# Patient Record
Sex: MALE | Race: WHITE | Employment: FULL TIME | URBAN - METROPOLITAN AREA
[De-identification: names, ages, dates, MRNs, and addresses within clinical notes are randomized per-mention and may not be internally consistent; named-entity substitution may affect disease eponyms.]

---

## 2017-02-16 ENCOUNTER — OFFICE VISIT (OUTPATIENT)
Dept: INTERNAL MEDICINE CLINIC | Age: 52
End: 2017-02-16

## 2017-02-16 VITALS
BODY MASS INDEX: 28.51 KG/M2 | TEMPERATURE: 98.5 F | RESPIRATION RATE: 18 BRPM | HEART RATE: 87 BPM | SYSTOLIC BLOOD PRESSURE: 122 MMHG | OXYGEN SATURATION: 98 % | HEIGHT: 70 IN | DIASTOLIC BLOOD PRESSURE: 70 MMHG | WEIGHT: 199.13 LBS

## 2017-02-16 DIAGNOSIS — J06.9 VIRAL UPPER RESPIRATORY TRACT INFECTION: Primary | ICD-10-CM

## 2017-02-16 DIAGNOSIS — L30.9 ECZEMA, UNSPECIFIED TYPE: ICD-10-CM

## 2017-02-16 RX ORDER — OXYMETAZOLINE HCL 0.05 %
2 SPRAY, NON-AEROSOL (ML) NASAL 2 TIMES DAILY
Qty: 1 BOTTLE | Refills: 0 | COMMUNITY
End: 2017-06-27

## 2017-02-16 RX ORDER — FLUOCINONIDE CREAM (EMULSIFIED BASE) 0.5 MG/G
CREAM TOPICAL 2 TIMES DAILY
Qty: 60 G | Refills: 1 | Status: SHIPPED | OUTPATIENT
Start: 2017-02-16 | End: 2020-01-22

## 2017-02-16 RX ORDER — NAPROXEN SODIUM 220 MG
220 TABLET ORAL 2 TIMES DAILY WITH MEALS
Qty: 60 TAB | Refills: 0 | COMMUNITY
End: 2017-06-27

## 2017-02-16 NOTE — PROGRESS NOTES
HISTORY OF PRESENT ILLNESS  Raissa Watson is a 50 y.o. male. HPI  Upper respiratory illness:  Raissa Watson presents with complaints of congestion, sneezing, sore throat, post nasal drip, cough described as productive of clear sputum and clear nasal discharge for 4 days. no nausea and no vomiting . he has not had  night sweats, myalgias, fever and chills. Symptoms are moderate. Over-the-counter remedies including mucinex, sinus/ cold medication   has been used with poor relief of symptoms. Drinking plenty of fluids: yes  Asthma?:  no  non-smoker  Contacts with similar infections: yes       Rash:  he presents with rash/ skin problem located on lower legs, abdomen . Onset of skin problem was 2 months ago. Itching: yes. Flaking or scaling:no. Pain: no .  Weeping/ draining:  no.   Exposures: no  Medications tried include:  otc gold bond,  and was not effective. he does not have a history of eczema          ROS    Physical Exam   Constitutional: He appears well-developed and well-nourished. No distress. /70 (BP 1 Location: Left arm, BP Patient Position: Sitting)  Pulse 87  Temp 98.5 °F (36.9 °C) (Oral)   Resp 18  Ht 5' 9.5\" (1.765 m)  Wt 199 lb 2 oz (90.3 kg)  SpO2 98%  BMI 28.98 kg/m2   HENT:   Head: Normocephalic and atraumatic. Right Ear: Tympanic membrane and ear canal normal. No decreased hearing is noted. Left Ear: Tympanic membrane and ear canal normal. No decreased hearing is noted. Nose: Mucosal edema and rhinorrhea present. No epistaxis. Right sinus exhibits no maxillary sinus tenderness and no frontal sinus tenderness. Left sinus exhibits no maxillary sinus tenderness and no frontal sinus tenderness. Mouth/Throat: Uvula is midline and mucous membranes are normal. No oral lesions. Normal dentition. Posterior oropharyngeal erythema present. No oropharyngeal exudate, posterior oropharyngeal edema or tonsillar abscesses.    Eyes: Conjunctivae are normal. Pupils are equal, round, and reactive to light. Right eye exhibits no discharge. Left eye exhibits no discharge. No scleral icterus. Neck: Neck supple. Cardiovascular: Normal rate, regular rhythm and normal heart sounds. Pulmonary/Chest: Effort normal and breath sounds normal.   Lymphadenopathy:     He has no cervical adenopathy. Neurological: He is alert. Skin: Skin is warm and dry. He is not diaphoretic. Scattered erythematous vesicular rash where depicted. No pustules. Some excoriation   Nursing note and vitals reviewed. ASSESSMENT and PLAN  Vidal Marie was seen today for sinus infection. Diagnoses and all orders for this visit:    Viral upper respiratory tract infection  Kathy Corrales was diagnosed with a viral upper respiratory infection. he is advised to drink plenty of water, use shower steam or humidifier to loosen secretions, saline nasal lavage 3 times daily and get plenty of rest.  he may use mucinex 1200mg twice daily along with tylenol or advil as needed for fever and pain. Written instructions were given to the patient emphasizing these recommendations. The patient was advised to rinse nasal passages with saline solution. Instructions were given for this. Eczema, unspecified type  -     fluocinonide-emollient (LIDEX-E) 0.05 % topical cream; Apply  to affected area two (2) times a day. Follow-up Disposition:  Return if symptoms worsen or fail to improve.

## 2017-02-16 NOTE — MR AVS SNAPSHOT
Visit Information Date & Time Provider Department Dept. Phone Encounter #  
 2/16/2017  4:00 PM Lady Mills MD [facility] [address] [id number] Follow-up Instructions Return if symptoms worsen or fail to improve. Upcoming Health Maintenance Date Due DTaP/Tdap/Td series (1 - Tdap) 1/5/1988 FOBT Q 1 YEAR AGE 50-75 1/5/2017 Allergies as of 2/16/2017  Review Complete On: 2/16/2017 By: Will Robbins LPN No Known Allergies Current Immunizations  Never Reviewed Name Date Influenza Vaccine 9/21/2016, 11/16/2015 Not reviewed this visit You Were Diagnosed With   
  
 Codes Comments Viral upper respiratory tract infection    -  Primary ICD-10-CM: J06.9, B97.89 ICD-9-CM: 465.9 Eczema, unspecified type     ICD-10-CM: L30.9 ICD-9-CM: 692.9 Vitals BP Pulse Temp Resp Height(growth percentile) Weight(growth percentile) 122/70 (BP 1 Location: Left arm, BP Patient Position: Sitting) 87 98.5 °F (36.9 °C) (Oral) 18 5' 9.5\" (1.765 m) 199 lb 2 oz (90.3 kg) SpO2 BMI Smoking Status 98% 28.98 kg/m2 Never Smoker Vitals History BMI and BSA Data Body Mass Index Body Surface Area  
 28.98 kg/m 2 2.1 m 2 Preferred Pharmacy Pharmacy Name Phone [id number]- [place], [address] [place] [address]) [phone] Your Updated Medication List  
  
   
This list is accurate as of: 2/16/17  4:53 PM.  Always use your most recent med list. ADVIL COLD AND SINUS  mg Cap Generic drug:  Pseudoephedrine-Ibuprofen Take  by mouth as needed. AFRIN (OXYMETAZOLINE) 0.05 % nasal spray Generic drug:  oxymetazoline 2 Sprays two (2) times a day. ALEVE 220 mg tablet Generic drug:  naproxen sodium Take 1 Tab by mouth two (2) times daily (with meals). allopurinol 300 mg tablet Commonly known as: Emily Quesadaa Take 1 Tab by mouth daily. biotin 1 mg Tab Take 1 Tab by mouth daily. citalopram 20 mg tablet Commonly known as:  Algernon Kotyk Take 20 mg by mouth daily. finasteride 5 mg tablet Commonly known as:  PROSCAR Take 0.5 Tabs by mouth daily. FISH OIL 1,000 mg Cap Generic drug:  omega-3 fatty acids-vitamin e Take 4 Caps by mouth daily. fluocinoNIDE 0.05 % topical cream  
Commonly known as:  LIDEX APPLY TO RASH ON BODY TWICE A DAY AS NEEDED -- DO NOT USE ON FACE  
  
 fluocinonide-emollient 0.05 % topical cream  
Commonly known as:  LIDEX-E Apply  to affected area two (2) times a day. fluticasone 50 mcg/actuation nasal spray Commonly known as:  FLONASE  
as needed. * MUCINEX 1,200 mg Ta12 ER tablet Generic drug:  guaiFENesin Take 1,200 mg by mouth two (2) times a day. * guaiFENesin 1,200 mg Ta12 ER tablet Commonly known as:  Jaspreet & Jaspreet Take  by mouth two (2) times a day. MULTIVITAMIN PO Take  by mouth daily. simvastatin 20 mg tablet Commonly known as:  ZOCOR Take 1 Tab by mouth nightly. tretinoin 0.025 % tpical gel APPLY A PEA-SIZED AMOUNT TO FACE EXTERNALLY AT NIGHT * Notice: This list has 2 medication(s) that are the same as other medications prescribed for you. Read the directions carefully, and ask your doctor or other care provider to review them with you. Prescriptions Sent to Pharmacy Refills  
 fluocinonide-emollient (LIDEX-E) 0.05 % topical cream 1 Sig: Apply  to affected area two (2) times a day. Class: Normal  
 Pharmacy: [id number]- [address], [address] [address]) Ph #: [phone] Route: Topical  
  
Follow-up Instructions Return if symptoms worsen or fail to improve. Patient Instructions Atopic Dermatitis: Care Instructions Your Care Instructions Atopic dermatitis (also called eczema) is a skin problem that causes intense itching and a red, raised rash. In severe cases, the rash develops clear fluidfilled blisters. The rash is not contagious. People with this condition seem to have very sensitive immune systems that are likely to react to things that cause allergies. The immune system is the body's way of fighting infection. There is no cure for atopic dermatitis, but you may be able to control it with care at home. Follow-up care is a key part of your treatment and safety. Be sure to make and go to all appointments, and call your doctor if you are having problems. It's also a good idea to know your test results and keep a list of the medicines you take. How can you care for yourself at home? · Use moisturizer at least twice a day. · If your doctor prescribes a cream, use it as directed. If your doctor prescribes other medicine, take it exactly as directed. · Wash the affected area with water only. Soap can make dryness and itching worse. Pat dry. · Apply a moisturizer after bathing. Use a cream such as Lubriderm, Moisturel, or Cetaphil that does not irritate the skin or cause a rash. Apply the cream while your skin is still damp after lightly drying with a towel. · Use cold, wet cloths to reduce itching. · Keep cool, and stay out of the sun. · If itching affects your normal activities, an over-the-counter antihistamine, such as diphenhydramine (Benadryl) or loratadine (Claritin) may help. Read and follow all instructions on the label. When should you call for help? Call your doctor now or seek immediate medical care if: 
· Your rash gets worse and you have a fever. · You have new blisters or bruises, or the rash spreads and looks like a sunburn. · You have signs of infection, such as: 
¨ Increased pain, swelling, warmth, or redness. ¨ Red streaks leading from the rash. ¨ Pus draining from the rash. ¨ A fever. · You have crusting or oozing sores.  
· You have joint aches or body aches along with your rash. Watch closely for changes in your health, and be sure to contact your doctor if: 
· Your rash does not clear up after 2 to 3 weeks of home treatment. · Itching interferes with your sleep or daily activities. Where can you learn more? Go to http://vivi-marjorie.info/. Enter E605 in the search box to learn more about \"Atopic Dermatitis: Care Instructions. \" Current as of: February 5, 2016 Content Version: 11.1 © 6998-2603 Urbantech. Care instructions adapted under license by Pixafy (which disclaims liability or warranty for this information). If you have questions about a medical condition or this instruction, always ask your healthcare professional. [address] any warranty or liability for your use of this information. Saline Nasal Washes: Care Instructions Your Care Instructions Saline nasal washes help keep the nasal passages open by washing out thick or dried mucus. This simple remedy can help relieve symptoms of allergies, sinusitis, and colds. It also can make the nose feel more comfortable by keeping the mucous membranes moist. You may notice a little burning sensation in your nose the first few times you use the solution, but this usually gets better in a few days. Follow-up care is a key part of your treatment and safety. Be sure to make and go to all appointments, and call your doctor if you are having problems. It's also a good idea to know your test results and keep a list of the medicines you take. How can you care for yourself at home? · You can buy premixed saline solution in a squeeze bottle or other sinus rinse products at a drugstore. Read and follow the instructions on the label. · You also can make your own saline solution by adding 1 teaspoon of salt and 1 teaspoon of baking soda to 2 cups of distilled water. · If you use a homemade solution, pour a small amount into a clean bowl. Using a rubber bulb syringe, squeeze the syringe and place the tip in the salt water. Pull a small amount of the salt water into the syringe by relaxing your hand. · Sit down with your head tilted slightly back. Do not lie down. Put the tip of the bulb syringe or the squeeze bottle a little way into one of your nostrils. Gently drip or squirt a few drops into the nostril. Repeat with the other nostril. Some sneezing and gagging are normal at first. 
· Gently blow your nose. · Wipe the syringe or bottle tip clean after each use. · Repeat this 2 or 3 times a day. · Use nasal washes gently if you have nosebleeds often. When should you call for help? Watch closely for changes in your health, and be sure to contact your doctor if: 
· You often get nosebleeds. · You have problems doing the nasal washes. Where can you learn more? Go to http://viviAQSmarjorie.info/. Enter [id number] in the search box to learn more about \"Saline Nasal Washes: Care Instructions. \" Current as of: July 29, 2016 Content Version: 11.1 © 6346-1579 [employer]. Care instructions adapted under license by Netviewer (which disclaims liability or warranty for this information). If you have questions about a medical condition or this instruction, always ask your healthcare professional. [address] any warranty or liability for your use of this information. Upper Respiratory Infection (Cold): Care Instructions Your Care Instructions An upper respiratory infection, or URI, is an infection of the nose, sinuses, or throat. URIs are spread by coughs, sneezes, and direct contact. The common cold is the most frequent kind of URI. The flu and sinus infections are other kinds of URIs. Almost all URIs are caused by viruses. Antibiotics won't cure them. But you can treat most infections with home care.  This may include drinking lots of fluids and taking over-the-counter pain medicine. You will probably feel better in 4 to 10 days. The doctor has checked you carefully, but problems can develop later. If you notice any problems or new symptoms, get medical treatment right away. Follow-up care is a key part of your treatment and safety. Be sure to make and go to all appointments, and call your doctor if you are having problems. It's also a good idea to know your test results and keep a list of the medicines you take. How can you care for yourself at home? · To prevent dehydration, drink plenty of fluids, enough so that your urine is light yellow or clear like water. Choose water and other caffeine-free clear liquids until you feel better. If you have kidney, heart, or liver disease and have to limit fluids, talk with your doctor before you increase the amount of fluids you drink. · Take an over-the-counter pain medicine, such as acetaminophen (Tylenol), ibuprofen (Advil, Motrin), or naproxen (Aleve). Read and follow all instructions on the label. · Before you use cough and cold medicines, check the label. These medicines may not be safe for young children or for people with certain health problems. · Be careful when taking over-the-counter cold or flu medicines and Tylenol at the same time. Many of these medicines have acetaminophen, which is Tylenol. Read the labels to make sure that you are not taking more than the recommended dose. Too much acetaminophen (Tylenol) can be harmful. · Get plenty of rest. 
· Do not smoke or allow others to smoke around you. If you need help quitting, talk to your doctor about stop-smoking programs and medicines. These can increase your chances of quitting for good. When should you call for help? Call 911 anytime you think you may need emergency care. For example, call if: 
· You have severe trouble breathing. Call your doctor now or seek immediate medical care if: 
· You seem to be getting much sicker. · You have new or worse trouble breathing. · You have a new or higher fever. · You have a new rash. Watch closely for changes in your health, and be sure to contact your doctor if: 
· You have a new symptom, such as a sore throat, an earache, or sinus pain. · You cough more deeply or more often, especially if you notice more mucus or a change in the color of your mucus. · You do not get better as expected. Where can you learn more? Go to http://vivi-marjorie.info/. Enter U030 in the search box to learn more about \"Upper Respiratory Infection (Cold): Care Instructions. \" Current as of: June 30, 2016 Content Version: 11.1 © 7776-7841 Endoclear. Care instructions adapted under license by SureWaves (which disclaims liability or warranty for this information). If you have questions about a medical condition or this instruction, always ask your healthcare professional. [address] any warranty or liability for your use of this information. Introducing [facility]! Camelia Banks introduces Radiojar patient portal. Now you can access parts of your medical record, email your doctor's office, and request medication refills online. 1. In your internet browser, go to [email or web address] 2. Click on the First Time User? Click Here link in the Sign In box. You will see the New Member Sign Up page. 3. Enter your Radiojar Access Code exactly as it appears below. You will not need to use this code after youve completed the sign-up process. If you do not sign up before the expiration date, you must request a new code. · [employer]Wing[employer] Access Code: BLANCA SHARPE [facility] Expires: 5/17/2017  4:53 PM 
 
4. Enter the last four digits of your Social Security Number (xxxx) and Date of Birth (mm/dd/yyyy) as indicated and click Submit. You will be taken to the next sign-up page. 5. Create a TradeHerot ID.  This will be your TradeHerot login ID and cannot be changed, so think of one that is secure and easy to remember. 6. Create a Intention Technology password. You can change your password at any time. 7. Enter your Password Reset Question and Answer. This can be used at a later time if you forget your password. 8. Enter your e-mail address. You will receive e-mail notification when new information is available in [address] 9. Click Sign Up. You can now view and download portions of your medical record. 10. Click the Download Summary menu link to download a portable copy of your medical information. If you have questions, please visit the Frequently Asked Questions section of the [employer]ention Technology website. Remember, [employer] Technology is NOT to be used for urgent needs. For medical emergencies, dial 911. Now available from your iPhone and Android! Please provide this summary of care documentation to your next provider. Your primary care clinician is listed as Christel Judd. If you have any questions after today's visit, please call [phone].

## 2017-02-16 NOTE — PATIENT INSTRUCTIONS
Atopic Dermatitis: Care Instructions  Your Care Instructions  Atopic dermatitis (also called eczema) is a skin problem that causes intense itching and a red, raised rash. In severe cases, the rash develops clear fluid-filled blisters. The rash is not contagious. People with this condition seem to have very sensitive immune systems that are likely to react to things that cause allergies. The immune system is the body's way of fighting infection. There is no cure for atopic dermatitis, but you may be able to control it with care at home. Follow-up care is a key part of your treatment and safety. Be sure to make and go to all appointments, and call your doctor if you are having problems. It's also a good idea to know your test results and keep a list of the medicines you take. How can you care for yourself at home? · Use moisturizer at least twice a day. · If your doctor prescribes a cream, use it as directed. If your doctor prescribes other medicine, take it exactly as directed. · Wash the affected area with water only. Soap can make dryness and itching worse. Pat dry. · Apply a moisturizer after bathing. Use a cream such as Lubriderm, Moisturel, or Cetaphil that does not irritate the skin or cause a rash. Apply the cream while your skin is still damp after lightly drying with a towel. · Use cold, wet cloths to reduce itching. · Keep cool, and stay out of the sun. · If itching affects your normal activities, an over-the-counter antihistamine, such as diphenhydramine (Benadryl) or loratadine (Claritin) may help. Read and follow all instructions on the label. When should you call for help? Call your doctor now or seek immediate medical care if:  · Your rash gets worse and you have a fever. · You have new blisters or bruises, or the rash spreads and looks like a sunburn. · You have signs of infection, such as:  ¨ Increased pain, swelling, warmth, or redness. ¨ Red streaks leading from the rash.   ¨ Pus draining from the rash. ¨ A fever. · You have crusting or oozing sores. · You have joint aches or body aches along with your rash. Watch closely for changes in your health, and be sure to contact your doctor if:  · Your rash does not clear up after 2 to 3 weeks of home treatment. · Itching interferes with your sleep or daily activities. Where can you learn more? Go to http://vivi-marjorie.info/. Enter T834 in the search box to learn more about \"Atopic Dermatitis: Care Instructions. \"  Current as of: February 5, 2016  Content Version: 11.1  © 7448-4770 [employer]. Care instructions adapted under license by [employer] (which disclaims liability or warranty for this information). If you have questions about a medical condition or this instruction, always ask your healthcare professional. [place] [address] any warranty or liability for your use of this information. Saline Nasal Washes: Care Instructions  Your Care Instructions  Saline nasal washes help keep the nasal passages open by washing out thick or dried mucus. This simple remedy can help relieve symptoms of allergies, sinusitis, and colds. It also can make the nose feel more comfortable by keeping the mucous membranes moist. You may notice a little burning sensation in your nose the first few times you use the solution, but this usually gets better in a few days. Follow-up care is a key part of your treatment and safety. Be sure to make and go to all appointments, and call your doctor if you are having problems. It's also a good idea to know your test results and keep a list of the medicines you take. How can you care for yourself at home? · You can buy premixed saline solution in a squeeze bottle or other sinus rinse products at a drugstore. Read and follow the instructions on the label.   · You also can make your own saline solution by adding 1 teaspoon of salt and 1 teaspoon of baking soda to 2 cups of distilled water. · If you use a homemade solution, pour a small amount into a clean bowl. Using a rubber bulb syringe, squeeze the syringe and place the tip in the salt water. Pull a small amount of the salt water into the syringe by relaxing your hand. · Sit down with your head tilted slightly back. Do not lie down. Put the tip of the bulb syringe or the squeeze bottle a little way into one of your nostrils. Gently drip or squirt a few drops into the nostril. Repeat with the other nostril. Some sneezing and gagging are normal at first.  · Gently blow your nose. · Wipe the syringe or bottle tip clean after each use. · Repeat this 2 or 3 times a day. · Use nasal washes gently if you have nosebleeds often. When should you call for help? Watch closely for changes in your health, and be sure to contact your doctor if:  · You often get nosebleeds. · You have problems doing the nasal washes. Where can you learn more? Go to http://vivi-marjorie.info/. Enter [id number] [id number] in the search box to learn more about \"Saline Nasal Washes: Care Instructions. \"  Current as of: July 29, 2016  Content Version: 11.1  © 3948-7394 [employer]. Care instructions adapted under license by [employer] (which disclaims liability or warranty for this information). If you have questions about a medical condition or this instruction, always ask your healthcare professional. [place] [address] any warranty or liability for your use of this information. Upper Respiratory Infection (Cold): Care Instructions  Your Care Instructions    An upper respiratory infection, or URI, is an infection of the nose, sinuses, or throat. URIs are spread by coughs, sneezes, and direct contact. The common cold is the most frequent kind of URI. The flu and sinus infections are other kinds of URIs. Almost all URIs are caused by viruses. Antibiotics won't cure them.  But you can treat most infections with home care. This may include drinking lots of fluids and taking over-the-counter pain medicine. You will probably feel better in 4 to 10 days. The doctor has checked you carefully, but problems can develop later. If you notice any problems or new symptoms, get medical treatment right away. Follow-up care is a key part of your treatment and safety. Be sure to make and go to all appointments, and call your doctor if you are having problems. It's also a good idea to know your test results and keep a list of the medicines you take. How can you care for yourself at home? · To prevent dehydration, drink plenty of fluids, enough so that your urine is light yellow or clear like water. Choose water and other caffeine-free clear liquids until you feel better. If you have kidney, heart, or liver disease and have to limit fluids, talk with your doctor before you increase the amount of fluids you drink. · Take an over-the-counter pain medicine, such as acetaminophen (Tylenol), ibuprofen (Advil, Motrin), or naproxen (Aleve). Read and follow all instructions on the label. · Before you use cough and cold medicines, check the label. These medicines may not be safe for young children or for people with certain health problems. · Be careful when taking over-the-counter cold or flu medicines and Tylenol at the same time. Many of these medicines have acetaminophen, which is Tylenol. Read the labels to make sure that you are not taking more than the recommended dose. Too much acetaminophen (Tylenol) can be harmful. · Get plenty of rest.  · Do not smoke or allow others to smoke around you. If you need help quitting, talk to your doctor about stop-smoking programs and medicines. These can increase your chances of quitting for good. When should you call for help? Call 911 anytime you think you may need emergency care. For example, call if:  · You have severe trouble breathing.   Call your doctor now or seek immediate medical care if:  · You seem to be getting much sicker. · You have new or worse trouble breathing. · You have a new or higher fever. · You have a new rash. Watch closely for changes in your health, and be sure to contact your doctor if:  · You have a new symptom, such as a sore throat, an earache, or sinus pain. · You cough more deeply or more often, especially if you notice more mucus or a change in the color of your mucus. · You do not get better as expected. Where can you learn more? Go to http://vivi-marjorie.info/. Enter Z100 in the search box to learn more about \"Upper Respiratory Infection (Cold): Care Instructions. \"  Current as of: June 30, 2016  Content Version: 11.1  © 9516-6274 [employer]. Care instructions adapted under license by [employer] (which disclaims liability or warranty for this information). If you have questions about a medical condition or this instruction, always ask your healthcare professional. [address] any warranty or liability for your use of this information.

## 2017-05-08 DIAGNOSIS — J06.9 ACUTE URI: ICD-10-CM

## 2017-05-08 RX ORDER — CITALOPRAM 40 MG/1
40 TABLET, FILM COATED ORAL DAILY
Qty: 90 TAB | Refills: 1 | Status: SHIPPED | OUTPATIENT
Start: 2017-05-08 | End: 2017-11-09 | Stop reason: SDUPTHER

## 2017-05-08 NOTE — TELEPHONE ENCOUNTER
Pt states that he has been on 40mg that was prescribed by another doctor and he would like Dr. Teena Shay to refill it. He would like a callback from the nurse to discuss if necessary.   [phone]

## 2017-06-27 ENCOUNTER — TELEPHONE (OUTPATIENT)
Dept: INTERNAL MEDICINE CLINIC | Age: 52
End: 2017-06-27

## 2017-06-27 ENCOUNTER — OFFICE VISIT (OUTPATIENT)
Dept: FAMILY MEDICINE CLINIC | Facility: CLINIC | Age: 52
End: 2017-06-27

## 2017-06-27 VITALS — WEIGHT: 178 LBS | HEIGHT: 70 IN | BODY MASS INDEX: 25.48 KG/M2

## 2017-06-27 DIAGNOSIS — J01.40 ACUTE NON-RECURRENT PANSINUSITIS: Primary | ICD-10-CM

## 2017-06-27 RX ORDER — AMOXICILLIN AND CLAVULANATE POTASSIUM 875; 125 MG/1; MG/1
1 TABLET, FILM COATED ORAL EVERY 12 HOURS
Qty: 14 TAB | Refills: 0 | Status: SHIPPED | OUTPATIENT
Start: 2017-06-27 | End: 2017-06-27 | Stop reason: SDUPTHER

## 2017-06-27 RX ORDER — AMOXICILLIN AND CLAVULANATE POTASSIUM 875; 125 MG/1; MG/1
1 TABLET, FILM COATED ORAL EVERY 12 HOURS
Qty: 14 TAB | Refills: 0 | Status: SHIPPED | OUTPATIENT
Start: 2017-06-27 | End: 2017-07-04

## 2017-06-27 NOTE — PATIENT INSTRUCTIONS
Sinusitis: Care Instructions  Your Care Instructions    Sinusitis is an infection of the lining of the sinus cavities in your head. Sinusitis often follows a cold. It causes pain and pressure in your head and face. In most cases, sinusitis gets better on its own in 1 to 2 weeks. But some mild symptoms may last for several weeks. Sometimes antibiotics are needed. Follow-up care is a key part of your treatment and safety. Be sure to make and go to all appointments, and call your doctor if you are having problems. It's also a good idea to know your test results and keep a list of the medicines you take. How can you care for yourself at home? · Take an over-the-counter pain medicine, such as acetaminophen (Tylenol), ibuprofen (Advil, Motrin), or naproxen (Aleve). Read and follow all instructions on the label. · If the doctor prescribed antibiotics, take them as directed. Do not stop taking them just because you feel better. You need to take the full course of antibiotics. · Be careful when taking over-the-counter cold or flu medicines and Tylenol at the same time. Many of these medicines have acetaminophen, which is Tylenol. Read the labels to make sure that you are not taking more than the recommended dose. Too much acetaminophen (Tylenol) can be harmful. · Breathe warm, moist air from a steamy shower, a hot bath, or a sink filled with hot water. Avoid cold, dry air. Using a humidifier in your home may help. Follow the directions for cleaning the machine. · Use saline (saltwater) nasal washes to help keep your nasal passages open and wash out mucus and bacteria. You can buy saline nose drops at a grocery store or drugstore. Or you can make your own at home by adding 1 teaspoon of salt and 1 teaspoon of baking soda to 2 cups of distilled water. If you make your own, fill a bulb syringe with the solution, insert the tip into your nostril, and squeeze gently. [place] your nose.   · Put a hot, wet towel or a warm gel pack on your face 3 or 4 times a day for 5 to 10 minutes each time. · Try a decongestant nasal spray like oxymetazoline (Afrin). Do not use it for more than 3 days in a row. Using it for more than 3 days can make your congestion worse. When should you call for help? Call your doctor now or seek immediate medical care if:  · You have new or worse swelling or redness in your face or around your eyes. · You have a new or higher fever. Watch closely for changes in your health, and be sure to contact your doctor if:  · You have new or worse facial pain. · The mucus from your nose becomes thicker (like pus) or has new blood in it. · You are not getting better as expected. Where can you learn more? Go to http://vivi-marjorie.info/. Enter E941 in the search box to learn more about \"Sinusitis: Care Instructions. \"  Current as of: July 29, 2016  Content Version: 11.3  © 4888-8038 [employer]. Care instructions adapted under license by [employer] (which disclaims liability or warranty for this information). If you have questions about a medical condition or this instruction, always ask your healthcare professional. [place] [address] any warranty or liability for your use of this information. Saline Nasal Washes: Care Instructions  Your Care Instructions  Saline nasal washes help keep the nasal passages open by washing out thick or dried mucus. This simple remedy can help relieve symptoms of allergies, sinusitis, and colds. It also can make the nose feel more comfortable by keeping the mucous membranes moist. You may notice a little burning sensation in your nose the first few times you use the solution, but this usually gets better in a few days. Follow-up care is a key part of your treatment and safety. Be sure to make and go to all appointments, and call your doctor if you are having problems.  It's also a good idea to know your test results and keep a list of the medicines you take. How can you care for yourself at home? · You can buy premixed saline solution in a squeeze bottle or other sinus rinse products at a drugstore. Read and follow the instructions on the label. · You also can make your own saline solution by adding 1 teaspoon of salt and 1 teaspoon of baking soda to 2 cups of distilled water. · If you use a homemade solution, pour a small amount into a clean bowl. Using a rubber bulb syringe, squeeze the syringe and place the tip in the salt water. Pull a small amount of the salt water into the syringe by relaxing your hand. · Sit down with your head tilted slightly back. Do not lie down. Put the tip of the bulb syringe or the squeeze bottle a little way into one of your nostrils. Gently drip or squirt a few drops into the nostril. Repeat with the other nostril. Some sneezing and gagging are normal at first.  · Gently blow your nose. · Wipe the syringe or bottle tip clean after each use. · Repeat this 2 or 3 times a day. · Use nasal washes gently if you have nosebleeds often. When should you call for help? Watch closely for changes in your health, and be sure to contact your doctor if:  · You often get nosebleeds. · You have problems doing the nasal washes. Where can you learn more? Go to http://vivi-marjorie.info/. Enter [id number] in the search box to learn more about \"Saline Nasal Washes: Care Instructions. \"  Current as of: May 4, 2017  Content Version: 11.3  © 4789-2468 [employer]. Care instructions adapted under license by [employer] (which disclaims liability or warranty for this information). If you have questions about a medical condition or this instruction, always ask your healthcare professional. [address] any warranty or liability for your use of this information.

## 2017-06-27 NOTE — MR AVS SNAPSHOT
Visit Information Date & Time Provider Department Dept. Phone Encounter #  
 6/27/2017 10:30 AM Oswald Norman NP [place] [employer] [place] [id number] [phone] [id number] Upcoming Health Maintenance Date Due DTaP/Tdap/Td series (1 - Tdap) 1/5/1988 FOBT Q 1 YEAR AGE 50-75 1/5/2017 INFLUENZA AGE 9 TO ADULT 8/1/2017 Allergies as of 6/27/2017  Review Complete On: 6/27/2017 By: Oswald Norman NP No Known Allergies Current Immunizations  Never Reviewed Name Date Influenza Vaccine 9/21/2016, 11/16/2015 Not reviewed this visit You Were Diagnosed With   
  
 Codes Comments Acute non-recurrent pansinusitis    -  Primary ICD-10-CM: J01.40 ICD-9-CM: 461.8 Vitals Height(growth percentile) Weight(growth percentile) BMI Smoking Status 5' 9.5\" (1.765 m) 178 lb (80.7 kg) 25.91 kg/m2 Never Smoker BMI and BSA Data Body Mass Index Body Surface Area  
 25.91 kg/m 2 1.99 m 2 Preferred Pharmacy [facility] Phone [id number] [address] ([address]) [phone] Your Updated Medication List  
  
   
This list is accurate as of: 6/27/17 10:59 AM.  Always use your most recent med list.  
  
  
  
  
 allopurinol 300 mg tablet Commonly known as:  Taylor Catracho Take 1 Tab by mouth daily. amoxicillin-clavulanate 875-125 mg per tablet Commonly known as:  AUGMENTIN Take 1 Tab by mouth every twelve (12) hours for 7 days. biotin 1 mg Tab Take 1 Tab by mouth daily. citalopram 40 mg tablet Commonly known as:  Kelvin Queta Take 1 Tab by mouth daily. finasteride 5 mg tablet Commonly known as:  PROSCAR  
TAKE 1/2 TABLET BY MOUTH EVERY DAY  
  
 FISH OIL 1,000 mg Cap Generic drug:  omega-3 fatty acids-vitamin e Take 4 Caps by mouth daily. fluocinonide-emollient 0.05 % topical cream  
Commonly known as:  LIDEX-E Apply  to affected area two (2) times a day. fluticasone 50 mcg/actuation nasal spray Commonly known as:  FLONASE  
as needed. MUCINEX 1,200 mg Ta12 ER tablet Generic drug:  guaiFENesin Take 1,200 mg by mouth two (2) times a day. MULTIVITAMIN PO Take  by mouth daily. simvastatin 20 mg tablet Commonly known as:  ZOCOR  
TAKE 1 TABLET BY MOUTH NIGHTLY.  
  
 tretinoin 0.025 % tpical gel APPLY A PEA-SIZED AMOUNT TO FACE EXTERNALLY AT NIGHT Prescriptions Sent to Pharmacy Refills  
 amoxicillin-clavulanate (AUGMENTIN) 875-125 mg per tablet 0 Sig: Take 1 Tab by mouth every twelve (12) hours for 7 days. Class: Normal  
 Pharmacy: [id number]- [address], [address] ([address]) Ph #: [phone] Route: Oral  
  
Patient Instructions Sinusitis: Care Instructions Your Care Instructions Sinusitis is an infection of the lining of the sinus cavities in your head. Sinusitis often follows a cold. It causes pain and pressure in your head and face. In most cases, sinusitis gets better on its own in 1 to 2 weeks. But some mild symptoms may last for several weeks. Sometimes antibiotics are needed. Follow-up care is a key part of your treatment and safety. Be sure to make and go to all appointments, and call your doctor if you are having problems. It's also a good idea to know your test results and keep a list of the medicines you take. How can you care for yourself at home? · Take an over-the-counter pain medicine, such as acetaminophen (Tylenol), ibuprofen (Advil, Motrin), or naproxen (Aleve). Read and follow all instructions on the label. · If the doctor prescribed antibiotics, take them as directed. Do not stop taking them just because you feel better. You need to take the full course of antibiotics. · Be careful when taking over-the-counter cold or flu medicines and Tylenol at the same time.  Many of these medicines have acetaminophen, which is Tylenol. Read the labels to make sure that you are not taking more than the recommended dose. Too much acetaminophen (Tylenol) can be harmful. · Breathe warm, moist air from a steamy shower, a hot bath, or a sink filled with hot water. Avoid cold, dry air. Using a humidifier in your home may help. Follow the directions for cleaning the machine. · Use saline (saltwater) nasal washes to help keep your nasal passages open and wash out mucus and bacteria. You can buy saline nose drops at a grocery store or [employer]. Or you can make your own at home by adding 1 teaspoon of salt and 1 teaspoon of baking soda to 2 cups of distilled water. If you make your own, fill a bulb syringe with the solution, insert the tip into your nostril, and squeeze gently. [place] your nose. · Put a hot, wet towel or a warm gel pack on your face 3 or 4 times a day for 5 to 10 minutes each time. · Try a decongestant nasal spray like oxymetazoline (Afrin). Do not use it for more than 3 days in a row. Using it for more than 3 days can make your congestion worse. When should you call for help? Call your doctor now or seek immediate medical care if: 
· You have new or worse swelling or redness in your face or around your eyes. · You have a new or higher fever. Watch closely for changes in your health, and be sure to contact your doctor if: 
· You have new or worse facial pain. · The mucus from your nose becomes thicker (like pus) or has new blood in it. · You are not getting better as expected. Where can you learn more? Go to http://vivi-marjorie.info/. Enter Q909 in the search box to learn more about \"Sinusitis: Care Instructions. \" Current as of: July 29, 2016 Content Version: 11.3 © 3896-2313 [employer]. Care instructions adapted under license by [employer] (which disclaims liability or warranty for this information).  If you have questions about a medical condition or this instruction, always ask your healthcare professional. [address] any warranty or liability for your use of this information. Saline Nasal Washes: Care Instructions Your Care Instructions Saline nasal washes help keep the nasal passages open by washing out thick or dried mucus. This simple remedy can help relieve symptoms of allergies, sinusitis, and colds. It also can make the nose feel more comfortable by keeping the mucous membranes moist. You may notice a little burning sensation in your nose the first few times you use the solution, but this usually gets better in a few days. Follow-up care is a key part of your treatment and safety. Be sure to make and go to all appointments, and call your doctor if you are having problems. It's also a good idea to know your test results and keep a list of the medicines you take. How can you care for yourself at home? · You can buy premixed saline solution in a squeeze bottle or other sinus rinse products at a drugstore. Read and follow the instructions on the label. · You also can make your own saline solution by adding 1 teaspoon of salt and 1 teaspoon of baking soda to 2 cups of distilled water. · If you use a homemade solution, pour a small amount into a clean bowl. Using a rubber bulb syringe, squeeze the syringe and place the tip in the salt water. Pull a small amount of the salt water into the syringe by relaxing your hand. · Sit down with your head tilted slightly back. Do not lie down. Put the tip of the bulb syringe or the squeeze bottle a little way into one of your nostrils. Gently drip or squirt a few drops into the nostril. Repeat with the other nostril. Some sneezing and gagging are normal at first. 
· Gently blow your nose. · Wipe the syringe or bottle tip clean after each use. · Repeat this 2 or 3 times a day. · Use nasal washes gently if you have nosebleeds often. When should you call for help?  
Watch closely for changes in your health, and be sure to contact your doctor if: 
· You often get nosebleeds. · You have problems doing the nasal washes. Where can you learn more? Go to http://vivi-marjorie.info/. Enter [id number] [id number] in the search box to learn more about \"Saline Nasal Washes: Care Instructions. \" Current as of: May 4, 2017 Content Version: 11.3 © 5059-9918 [employer]. Care instructions adapted under license by [employer] (which disclaims liability or warranty for this information). If you have questions about a medical condition or this instruction, always ask your healthcare professional. [address] any warranty or liability for your use of this information. Introducing [facility]! Verónica Clevermalu introduces ProteoSense patient portal. Now you can access parts of your medical record, email your doctor's office, and request medication refills online. 1. In your internet browser, go to [email or web address] [email or web address]/Inofile 2. Click on the First Time User? Click Here link in the Sign In box. You will see the New Member Sign Up page. 3. Enter your ProteoSense Access Code exactly as it appears below. You will not need to use this code after youve completed the sign-up process. If you do not sign up before the expiration date, you must request a new code. · ProteoSense Access Code: [email or web address] Expires: 9/25/2017 10:36 AM 
 
4. Enter the last four digits of your Social Security Number (xxxx) and Date of Birth (mm/dd/yyyy) as indicated and click Submit. You will be taken to the next sign-up page. 5. Create a ProteoSense ID. This will be your ProteoSense login ID and cannot be changed, so think of one that is secure and easy to remember. 6. Create a ProteoSense password. You can change your password at any time. 7. Enter your Password Reset Question and Answer. This can be used at a later time if you forget your password.   
8. Enter your e-mail address. You will receive e-mail notification when new information is available in [address]. 9. Click Sign Up. You can now view and download portions of your medical record. 10. Click the Download Summary menu link to download a portable copy of your medical information. If you have questions, please visit the Frequently Asked Questions section of the [employer] website. Remember, [employer] is NOT to be used for urgent needs. For medical emergencies, dial 911. Now available from your iPhone and Android! Please provide this summary of care documentation to your next provider. Your primary care clinician is listed as Caesar Gonzalez. If you have any questions after today's visit, please call [phone].

## 2017-06-27 NOTE — PROGRESS NOTES
Chief Complaint   Patient presents with    Ear Pain     2 day hx of sinus pressure/pain, headache and right ear pain. Whole right sideof face feels tender. Has used Advil for symptoms but is not helpful.  Sinus Pain    Headache     HISTORY OF PRESENT ILLNESS  Eim Cardona is a 50 y.o. male who presents with sinus pain/pressure, headache, and right ear pain for 2 days. Denies fever. States the whole right side of his face hurts. Ear Pain   The history is provided by the patient. This is a new problem. The current episode started 2 days ago. The problem occurs constantly. The problem has been gradually worsening. Associated symptoms include headaches. Pertinent negatives include no chest pain and no shortness of breath. Nothing aggravates the symptoms. Nothing relieves the symptoms. Sinus Pain    Associated symptoms include congestion, ear pain and headaches. Pertinent negatives include no chills, no sore throat, no cough, no shortness of breath and no chest pain. Headache   Associated symptoms include headaches. Pertinent negatives include no chest pain and no shortness of breath. Review of Systems   Constitutional: Negative for chills and fever. HENT: Positive for congestion and ear pain. Negative for ear discharge and sore throat. Sinus headache and pressure, facial tenderness, right ear ache. Eyes: Negative for discharge and redness. Respiratory: Negative for cough, shortness of breath and wheezing. Cardiovascular: Negative for chest pain and palpitations. Neurological: Positive for headaches.      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     Gout     High cholesterol     Hyperlipidemia 9/5/2014    Kidney stones     Nausea & vomiting     Seizures (HCC)     AS CHILD    Seizures (HCC)     childhood     Family History   Problem Relation Age of Onset    Cancer Mother      BREAST    Anxiety Mother     Cancer Father      PROSTATE    Anxiety Father     Anxiety Sister     Anxiety Brother     Anesth Problems Neg Hx     Diabetes Neg Hx     Heart Disease Neg Hx     Hypertension Neg Hx      Social History     Social History    Marital status:      Spouse name: N/A    Number of children: N/A    Years of education: N/A     Occupational History    Not on file. Social History Main Topics    Smoking status: Never Smoker    Smokeless tobacco: Never Used    Alcohol use 0.0 - 0.6 oz/week     0 - 1 Cans of beer per week    Drug use: No    Sexual activity: Yes     Partners: Female     Other Topics Concern    Not on file     Social History Narrative    ** Merged History Encounter **            Current Outpatient Prescriptions:     amoxicillin-clavulanate (AUGMENTIN) 875-125 mg per tablet, Take 1 Tab by mouth every twelve (12) hours for 7 days. , Disp: 14 Tab, Rfl: 0    finasteride (PROSCAR) 5 mg tablet, TAKE 1/2 TABLET BY MOUTH EVERY DAY, Disp: 45 Tab, Rfl: 1    simvastatin (ZOCOR) 20 mg tablet, TAKE 1 TABLET BY MOUTH NIGHTLY., Disp: 90 Tab, Rfl: 1    citalopram (CELEXA) 40 mg tablet, Take 1 Tab by mouth daily. , Disp: 90 Tab, Rfl: 1    guaiFENesin (MUCINEX) 1,200 mg Ta12 ER tablet, Take 1,200 mg by mouth two (2) times a day., Disp: , Rfl:     fluocinonide-emollient (LIDEX-E) 0.05 % topical cream, Apply  to affected area two (2) times a day., Disp: 60 g, Rfl: 1    tretinoin 0.025 % tpical gel, APPLY A PEA-SIZED AMOUNT TO FACE EXTERNALLY AT NIGHT, Disp: , Rfl: 3    fluticasone (FLONASE) 50 mcg/actuation nasal spray, as needed. , Disp: , Rfl:     omega-3 fatty acids-vitamin e (FISH OIL) 1,000 mg cap, Take 4 Caps by mouth daily. , Disp: , Rfl:     allopurinol (ZYLOPRIM) 300 mg tablet, Take 1 Tab by mouth daily. , Disp: 90 Tab, Rfl: 2    biotin 1 mg tab, Take 1 Tab by mouth daily. , Disp: , Rfl:     MULTIVITAMIN PO, Take  by mouth daily. , Disp: , Rfl: Objective:   Visit Vitals    Ht 5' 9.5\" (1.765 m)    Wt 178 lb (80.7 kg)    BMI 25.91 kg/m2     Physical Exam   Constitutional: He is oriented to person, place, and time. He appears well-developed and well-nourished. HENT:   Head: Normocephalic and atraumatic. Right Ear: External ear normal.   Nose: Nose normal.   Mouth/Throat: Oropharynx is clear and moist. No oropharyngeal exudate. Frontal and maxillary sinus tenderness upon palpation of the sinuses. Right ear wnl, no erythema, no tragal tenderness, TM pearly gray, non-bulging. No discharge. Left ear with small amount of brown cerumen. TM non-bulging, no erythema or drainage. Eyes: Conjunctivae are normal.   Cardiovascular: Normal rate, regular rhythm and normal heart sounds. Pulmonary/Chest: Effort normal and breath sounds normal.   Lymphadenopathy:     He has no cervical adenopathy. Neurological: He is alert and oriented to person, place, and time. Skin: Skin is warm and dry. Psychiatric: He has a normal mood and affect. ASSESSMENT and PLAN    ICD-10-CM ICD-9-CM    1. Acute non-recurrent pansinusitis J01.40 461.8 amoxicillin-clavulanate (AUGMENTIN) 875-125 mg per tablet      DISCONTINUED: amoxicillin-clavulanate (AUGMENTIN) 875-125 mg per tablet     Reviewed medications, s/s allergic reaction and side effects in detail. Instructed saline nasal rinses, warm compresses to the sinuses, use a humidifier, Tylenol or Ibuprofen for pain/fever, rest and increase fluid intake. Advised if symptoms do not improve in 5-7 days, or worsen prior, to f/u with PCP or seek further medical evaluation. After visit summary discussed with and given to patient who verbalized understanding and was given the opportunity to ask questions.

## 2017-06-27 NOTE — TELEPHONE ENCOUNTER
----- Message from Larry Watkins sent at 6/27/2017  7:45 AM EDT -----  Regarding: Dr. Ty Quinones  Patient woke up this am with a possible sinus/ear infection. He would like a call back to see if he can get a Rx. No appts available this week. Best contact number is [phone].

## 2017-06-27 NOTE — TELEPHONE ENCOUNTER
Patient verified by name and date of birth. Patient complained of sinus pressure, denies fever, SOB chest pain or any severe life-threatening symptoms. Patient advised that all providers are booked and providers do not prescribe antibiotics without evaluation, informed patient of [address] at [facility]. Patient agreed and expressed gratitude. No further concerns at this time.

## 2017-11-20 DIAGNOSIS — J06.9 ACUTE URI: ICD-10-CM

## 2017-11-20 RX ORDER — CITALOPRAM 40 MG/1
TABLET, FILM COATED ORAL
Qty: 90 TAB | Refills: 1 | Status: SHIPPED | OUTPATIENT
Start: 2017-11-20 | End: 2018-05-23 | Stop reason: SDUPTHER

## 2017-11-28 RX ORDER — ALLOPURINOL 300 MG/1
300 TABLET ORAL DAILY
Qty: 90 TAB | Refills: 1 | Status: SHIPPED | OUTPATIENT
Start: 2017-11-28 | End: 2018-05-16 | Stop reason: SDUPTHER

## 2017-11-28 NOTE — TELEPHONE ENCOUNTER
[facility] so refill isn't noted. New prescription sent as prescribed and patient notified.

## 2018-04-06 RX ORDER — SIMVASTATIN 20 MG/1
TABLET, FILM COATED ORAL
Qty: 90 TAB | Refills: 0 | Status: SHIPPED | OUTPATIENT
Start: 2018-04-06 | End: 2018-07-16 | Stop reason: SDUPTHER

## 2018-04-27 ENCOUNTER — HOSPITAL ENCOUNTER (EMERGENCY)
Age: 53
Discharge: HOME | End: 2018-04-27
Attending: EMERGENCY MEDICINE
Payer: COMMERCIAL

## 2018-04-27 VITALS
BODY MASS INDEX: 25.77 KG/M2 | TEMPERATURE: 97.9 F | SYSTOLIC BLOOD PRESSURE: 118 MMHG | HEART RATE: 58 BPM | OXYGEN SATURATION: 95 % | RESPIRATION RATE: 13 BRPM | HEIGHT: 70 IN | WEIGHT: 180 LBS | DIASTOLIC BLOOD PRESSURE: 76 MMHG

## 2018-04-27 DIAGNOSIS — T78.40XA ALLERGIC REACTION, INITIAL ENCOUNTER: Primary | ICD-10-CM

## 2018-04-27 PROCEDURE — 96361 HYDRATE IV INFUSION ADD-ON: CPT

## 2018-04-27 PROCEDURE — 96374 THER/PROPH/DIAG INJ IV PUSH: CPT

## 2018-04-27 PROCEDURE — 96375 TX/PRO/DX INJ NEW DRUG ADDON: CPT

## 2018-04-27 PROCEDURE — 74011250636 HC RX REV CODE- 250/636: Performed by: EMERGENCY MEDICINE

## 2018-04-27 PROCEDURE — 74011000250 HC RX REV CODE- 250: Performed by: EMERGENCY MEDICINE

## 2018-04-27 PROCEDURE — 99284 EMERGENCY DEPT VISIT MOD MDM: CPT

## 2018-04-27 RX ORDER — HYDROXYZINE 50 MG/1
50 TABLET, FILM COATED ORAL
Qty: 30 TAB | Refills: 0 | Status: SHIPPED | OUTPATIENT
Start: 2018-04-27 | End: 2018-05-07

## 2018-04-27 RX ORDER — PREDNISONE 50 MG/1
50 TABLET ORAL DAILY
Qty: 5 TAB | Refills: 0 | Status: SHIPPED | OUTPATIENT
Start: 2018-04-27 | End: 2018-05-02

## 2018-04-27 RX ORDER — FAMOTIDINE 10 MG/ML
20 INJECTION INTRAVENOUS
Status: COMPLETED | OUTPATIENT
Start: 2018-04-27 | End: 2018-04-27

## 2018-04-27 RX ORDER — DIPHENHYDRAMINE HYDROCHLORIDE 50 MG/ML
25 INJECTION, SOLUTION INTRAMUSCULAR; INTRAVENOUS
Status: COMPLETED | OUTPATIENT
Start: 2018-04-27 | End: 2018-04-27

## 2018-04-27 RX ORDER — FAMOTIDINE 20 MG/1
20 TABLET, FILM COATED ORAL
Qty: 10 TAB | Refills: 0 | Status: SHIPPED | OUTPATIENT
Start: 2018-04-27 | End: 2018-05-07

## 2018-04-27 RX ADMIN — FAMOTIDINE 20 MG: 10 INJECTION INTRAVENOUS at 02:25

## 2018-04-27 RX ADMIN — METHYLPREDNISOLONE SODIUM SUCCINATE 125 MG: 125 INJECTION, POWDER, FOR SOLUTION INTRAMUSCULAR; INTRAVENOUS at 02:25

## 2018-04-27 RX ADMIN — SODIUM CHLORIDE 1000 ML: 900 INJECTION, SOLUTION INTRAVENOUS at 02:25

## 2018-04-27 RX ADMIN — DIPHENHYDRAMINE HYDROCHLORIDE 25 MG: 50 INJECTION, SOLUTION INTRAMUSCULAR; INTRAVENOUS at 02:26

## 2018-04-27 NOTE — ED NOTES
Patient discharged by Mark Bui MD. Patient given the opportunity to ask questions, verbalized understanding of teaching.

## 2018-04-27 NOTE — ED PROVIDER NOTES
HPI Comments: 48 y.o. male with past medical history significant for HLD, kidney stones, depression, gout, who presents ambulatory to the ED accompanied by spouse, with chief complaint of lip swelling and sore throat, which he believes is an allergic reaction. Pt states that he had a mixture of \"nuts and seeds\" at 1830 last night (4/26/2018). He notes that he has had an allergic reaction to almonds in the past, but there were no almonds present in the nut/seed mixture tonight. Pt states that he felt fine initially after eating the nuts/seeds and took his dog for a walk, but then was on a conference call for work at ~2100 when he noticed that his mouth \"felt sore\". Pt reports that he looked into the mirror at that time and noticed that his lips were swollen. He notes that now his throat is sore, and he complains of associated odynophagia, but reports that he is physically able to swallow. Pt took a dose of Benadryl at 2230 last night, but was unable to sleep because he was anxious about his symptoms. He specifically denies any itching of the skin, hives or visible rash, SOB, CP, or fevers. Pt also denies having pain anywhere. There are no other acute medical concerns at this time. Social hx: Negative for Tobacco use; Positive for EtOH use (occasional); Negative for Illicit Drug Abuse    PCP: Emir Moura MD     Note written by Hazel Cabrera. Jennifer Helms, as dictated by Luiz Bumpers, MD 2:17 AM     The history is provided by the patient. No  was used.         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     Gout     High cholesterol     Hyperlipidemia 9/5/2014    Kidney stones     Nausea & vomiting     Seizures (HCC)     AS CHILD    Seizures (HCC)     childhood       Past Surgical History:   Procedure Laterality Date    HX CERVICAL FUSION      c6-c7 following fracture    HX HEENT      lasik    HX UROLOGICAL      KIDNEY STONE REMOVAL         Family History:   Problem Relation Age of Onset    Cancer Mother      BREAST    Anxiety Mother     Cancer Father      PROSTATE    Anxiety Father     Anxiety Sister     Anxiety Brother     Anesth Problems Neg Hx     Diabetes Neg Hx     Heart Disease Neg Hx     Hypertension Neg Hx        Social History     Social History    Marital status:      Spouse name: N/A    Number of children: N/A    Years of education: N/A     Occupational History    Not on file. Social History Main Topics    Smoking status: Never Smoker    Smokeless tobacco: Never Used    Alcohol use 0.0 - 0.6 oz/week     0 - 1 Cans of beer per week    Drug use: No    Sexual activity: Yes     Partners: Female     Other Topics Concern    Not on file     Social History Narrative    ** Merged History Encounter **              ALLERGIES: Review of patient's allergies indicates no known allergies. Review of Systems   Constitutional: Negative for fever. HENT: Positive for facial swelling (lips) and sore throat. Negative for trouble swallowing. Respiratory: Negative for shortness of breath. Cardiovascular: Negative for chest pain. Skin: Negative for rash. Psychiatric/Behavioral: The patient is nervous/anxious. All other systems reviewed and are negative. Vitals:    04/27/18 0158   BP: 129/78   Pulse: 67   Resp: 16   Temp: 97.9 °F (36.6 °C)   SpO2: 99%   Weight: 81.6 kg (180 lb)   Height: 5' 10\" (1.778 m)            Physical Exam   Nursing note and vitals reviewed. CONSTITUTIONAL: Well-appearing; well-nourished; in mild distress  HEAD: Normocephalic; atraumatic  EYES: PERRL; EOM intact; conjunctiva and sclera are clear bilaterally. ENT: angioedema lips and tongue; No rhinorrhea; normal pharynx with no tonsillar hypertrophy; mucous membranes pink/moist, no erythema, no exudate.   NECK: Supple; non-tender; no cervical lymphadenopathy  CARD: Normal S1, S2; no murmurs, rubs, or gallops. Regular rate and rhythm. RESP: Normal respiratory effort; breath sounds clear and equal bilaterally; no wheezes, rhonchi, or rales. ABD: Normal bowel sounds; non-distended; non-tender; no palpable organomegaly, no masses, no bruits. Back Exam: Normal inspection; no vertebral point tenderness, no CVA tenderness. Normal range of motion. EXT: Normal ROM in all four extremities; non-tender to palpation; no swelling or deformity; distal pulses are normal, no edema. SKIN: Warm; dry; no rash. NEURO:Alert and oriented x 3, coherent, CESAR-XII grossly intact, sensory and motor are non-focal.        MDM  Number of Diagnoses or Management Options  Allergic reaction, initial encounter:   Diagnosis management comments: Assessment: angioedema/allergic reaction to nuts      Plan: Solu-Medrol/ Benadryl/ Pepcid/ IV fluid/ serial exam/ Monitor and Reevaluate. Amount and/or Complexity of Data Reviewed  Clinical lab tests: ordered and reviewed  Tests in the radiology section of CPT®: ordered and reviewed  Tests in the medicine section of CPT®: ordered and reviewed  Discussion of test results with the performing providers: yes  Decide to obtain previous medical records or to obtain history from someone other than the patient: yes  Obtain history from someone other than the patient: yes  Review and summarize past medical records: yes  Discuss the patient with other providers: yes  Independent visualization of images, tracings, or specimens: yes    Risk of Complications, Morbidity, and/or Mortality  Presenting problems: moderate  Diagnostic procedures: moderate  Management options: moderate          ED Course       Procedures     Progress Note:   Pt has been reexamined by Luiz Bumpers, MD. Pt is feeling much better. Symptoms have improved. All available results have been reviewed with pt and any available family. Pt understands sx, dx, and tx in ED.  Care plan has been outlined and questions have been answered. Pt is ready to go home. Will send home on allergic reaction/ angioedema instruction. Prescription of prednisone, Atarax, and Pepcid. Outpatient referral with PCP as needed. Written by Gina Nolen MD,2:17 AM    .   .

## 2018-04-27 NOTE — DISCHARGE INSTRUCTIONS

## 2018-04-27 NOTE — ED TRIAGE NOTES
Pt reports having an allergic reaction with lip swelling and sore throat since tonight. Pt reports he is not allergic to anything that he knows of. Pt reports he went to the grocery store this evening and filled a bag up with sunflower seeds and some other stuff. Pt ambulatory to triage with steady gait. Pt aA&O x 4.

## 2018-05-10 RX ORDER — ALLOPURINOL 300 MG/1
TABLET ORAL
Qty: 90 TAB | Refills: 1 | OUTPATIENT
Start: 2018-05-10

## 2018-05-11 ENCOUNTER — TELEPHONE (OUTPATIENT)
Dept: INTERNAL MEDICINE CLINIC | Age: 53
End: 2018-05-11

## 2018-05-11 NOTE — TELEPHONE ENCOUNTER
Refill request denied as was on previous request. Patient informed appointment required within 30 days back in February due to him being overdue for a FU with PCP.

## 2018-05-16 RX ORDER — FINASTERIDE 5 MG/1
2.5 TABLET, FILM COATED ORAL DAILY
Qty: 7 TAB | Refills: 0 | Status: SHIPPED | OUTPATIENT
Start: 2018-05-16 | End: 2018-05-23 | Stop reason: SDUPTHER

## 2018-05-16 RX ORDER — ALLOPURINOL 300 MG/1
300 TABLET ORAL DAILY
Qty: 14 TAB | Refills: 0 | Status: SHIPPED | OUTPATIENT
Start: 2018-05-16 | End: 2018-05-23 | Stop reason: SDUPTHER

## 2018-05-23 ENCOUNTER — OFFICE VISIT (OUTPATIENT)
Dept: INTERNAL MEDICINE CLINIC | Age: 53
End: 2018-05-23

## 2018-05-23 VITALS
TEMPERATURE: 97.9 F | RESPIRATION RATE: 18 BRPM | DIASTOLIC BLOOD PRESSURE: 76 MMHG | WEIGHT: 186.13 LBS | BODY MASS INDEX: 26.65 KG/M2 | HEART RATE: 63 BPM | OXYGEN SATURATION: 97 % | SYSTOLIC BLOOD PRESSURE: 115 MMHG | HEIGHT: 70 IN

## 2018-05-23 DIAGNOSIS — L64.9 MALE PATTERN BALDNESS: ICD-10-CM

## 2018-05-23 DIAGNOSIS — F34.1 DYSTHYMIA: ICD-10-CM

## 2018-05-23 DIAGNOSIS — Z12.5 PROSTATE CANCER SCREENING: ICD-10-CM

## 2018-05-23 DIAGNOSIS — E78.00 PURE HYPERCHOLESTEROLEMIA: Primary | ICD-10-CM

## 2018-05-23 DIAGNOSIS — Z87.898 HISTORY OF ANGIOEDEMA: ICD-10-CM

## 2018-05-23 DIAGNOSIS — M10.9 GOUT, UNSPECIFIED CAUSE, UNSPECIFIED CHRONICITY, UNSPECIFIED SITE: Chronic | ICD-10-CM

## 2018-05-23 RX ORDER — CITALOPRAM 40 MG/1
TABLET, FILM COATED ORAL
Qty: 90 TAB | Refills: 1 | Status: SHIPPED | OUTPATIENT
Start: 2018-05-23 | End: 2019-03-11 | Stop reason: SDUPTHER

## 2018-05-23 RX ORDER — FINASTERIDE 5 MG/1
2.5 TABLET, FILM COATED ORAL DAILY
Qty: 15 TAB | Refills: 5 | Status: SHIPPED | OUTPATIENT
Start: 2018-05-23 | End: 2018-06-18 | Stop reason: SDUPTHER

## 2018-05-23 RX ORDER — ALLOPURINOL 300 MG/1
300 TABLET ORAL DAILY
Qty: 30 TAB | Refills: 5 | Status: SHIPPED | OUTPATIENT
Start: 2018-05-23 | End: 2018-05-24 | Stop reason: SDUPTHER

## 2018-05-23 RX ORDER — EPINEPHRINE 0.3 MG/.3ML
0.3 INJECTION SUBCUTANEOUS
Qty: 2 SYRINGE | Refills: 3 | Status: SHIPPED | OUTPATIENT
Start: 2018-05-23 | End: 2021-11-19 | Stop reason: ALTCHOICE

## 2018-05-23 NOTE — MR AVS SNAPSHOT
[address] [facility], [place], operated by [facility] 
 
 
 [address] [place] [address] 
[phone] Patient: Jocelyn Clark MRN: [id number] :1965 Visit Information Date & Time Provider Department Dept. Phone Encounter #  
 2018  8:00 AM Artie Govea MD [facility] [address] [facility] [id number] Follow-up Instructions Return in about 3 months (around 2018) for physical.  
  
Upcoming Health Maintenance Date Due DTaP/Tdap/Td series (1 - Tdap) 1988 FOBT Q 1 YEAR AGE 50-75 2017 Influenza Age 5 to Adult 2018 Allergies as of 2018  Review Complete On: 2018 By: Artie Govea MD  
  
 Severity Noted Reaction Type Reactions Nut - Unspecified  2018    Angioedema Current Immunizations  Never Reviewed Name Date Influenza Vaccine 2016, 2015 Not reviewed this visit You Were Diagnosed With   
  
 Codes Comments Pure hypercholesterolemia    -  Primary ICD-10-CM: E78.00 ICD-9-CM: 272.0 Gout, unspecified cause, unspecified chronicity, unspecified site     ICD-10-CM: M10.9 ICD-9-CM: 274.9 Dysthymia     ICD-10-CM: F34.1 ICD-9-CM: 300.4 History of angioedema     ICD-10-CM: Z87.898 ICD-9-CM: V13.89 Prostate cancer screening     ICD-10-CM: Z12.5 ICD-9-CM: V76.44 Vitals BP Pulse Temp Resp Height(growth percentile) Weight(growth percentile) 115/76 (BP 1 Location: Left arm, BP Patient Position: Sitting) 63 97.9 °F (36.6 °C) (Oral) 18 5' 10\" (1.778 m) 186 lb 2 oz (84.4 kg) SpO2 BMI Smoking Status 97% 26.71 kg/m2 Never Smoker BMI and BSA Data Body Mass Index Body Surface Area  
 26.71 kg/m 2 2.04 m 2 Preferred Pharmacy Pharmacy Name Phone [id number] [place], [address] ([place] [address]) [phone] Your Updated Medication List  
  
   
This list is accurate as of 18  8:34 AM.  Always use your most recent med list.  
  
  
  
  
 allopurinol 300 mg tablet Commonly known as:  [place]ar [place] Take 1 Tab by mouth daily. biotin 1 mg Tab Take 1 Tab by mouth daily. citalopram 40 mg tablet Commonly known as:  CELEXA  
TAKE 1 TABLET BY MOUTH EVERY DAY  
  
 EPINEPHrine 0.3 mg/0.3 mL injection Commonly known as:  EPIPEN  
0.3 mL by IntraMUSCular route once as needed for up to 1 dose. finasteride 5 mg tablet Commonly known as:  PROSCAR Take 0.5 Tabs by mouth daily. FISH OIL 1,000 mg Cap Generic drug:  omega-3 fatty acids-vitamin e Take 4 Caps by mouth daily. fluocinonide-emollient 0.05 % topical cream  
Commonly known as:  LIDEX-E Apply  to affected area two (2) times a day. MULTIVITAMIN PO Take  by mouth daily. simvastatin 20 mg tablet Commonly known as:  ZOCOR  
TAKE 1 TABLET BY MOUTH NIGHTLY.  
  
 tretinoin 0.025 % tpical gel APPLY A PEA-SIZED AMOUNT TO FACE EXTERNALLY AT NIGHT Prescriptions Printed Refills EPINEPHrine (EPIPEN) 0.3 mg/0.3 mL injection 3 Si.3 mL by IntraMUSCular route once as needed for up to 1 dose. Class: Print Route: IntraMUSCular Prescriptions Sent to Pharmacy Refills  
 allopurinol (ZYLOPRIM) 300 mg tablet 5 Sig: Take 1 Tab by mouth daily. Class: Normal  
 Pharmacy: [id number] [address] [facility], [address] [facility] [address] ([place] [address]) Ph #: [phone] Route: Oral  
 finasteride (PROSCAR) 5 mg tablet 5 Sig: Take 0.5 Tabs by mouth daily. Class: Normal  
 Pharmacy: [facility] #[id number]- [address] [facility] [address], [address] [facility] [address] ([place] [address]) Ph #: [phone] Route: Oral  
 citalopram (CELEXA) 40 mg tablet 1 Sig: TAKE 1 TABLET BY MOUTH EVERY DAY  Class: Normal  
 Pharmacy: [facility] Ph #: [phone] We Performed the Following LIPID PANEL [73652 CPT(R)] METABOLIC PANEL, BASIC [21031 CPT(R)] PSA, DIAGNOSTIC (PROSTATE SPECIFIC AG) F0397337 CPT(R)] REFERRAL TO ALLERGY [REF5 Custom] URIC ACID [id number] CPT(R)] Follow-up Instructions Return in about 3 months (around 8/23/2018) for physical.  
  
  
Referral Information Referral ID Referred By Referred To  
  
 [id number] [employer] Not Available Visits Status Start Date End Date 1 New Request 5/23/18 5/23/19 If your referral has a status of pending review or denied, additional information will be sent to support the outcome of this decision. Introducing [facility]! [facility] introduces [employer] patient portal. Now you can access parts of your medical record, email your doctor's office, and request medication refills online. 1. In your internet browser, go to [email or web address] 2. Click on the First Time User? Click Here link in the Sign In box. You will see the New Member Sign Up page. 3. Enter your [employer] Access Code exactly as it appears below. You will not need to use this code after youve completed the sign-up process. If you do not sign up before the expiration date, you must request a new code. · [employer] Access Code: [email or web address] Expires: 7/26/2018  1:57 AM 
 
4. Enter the last four digits of your Social Security Number (xxxx) and Date of Birth (mm/dd/yyyy) as indicated and click Submit. You will be taken to the next sign-up page. 5. Create a Radcom ID. This will be your Radcom login ID and cannot be changed, so think of one that is secure and easy to remember. 6. Create a [employer] password. You can change your password at any time. 7. Enter your Password Reset Question and Answer. This can be used at a later time if you forget your password. 8. Enter your e-mail address.  You will receive e-mail notification when new information is available in [employer]. 9. Click Sign Up. You can now view and download portions of your medical record. 10. Click the Download Summary menu link to download a portable copy of your medical information. If you have questions, please visit the Frequently Asked Questions section of the [employer] website. Remember, [employer] is NOT to be used for urgent needs. For medical emergencies, dial 911. Now available from your iPhone and Android! Please provide this summary of care documentation to your next provider. Your primary care clinician is listed as Rangel Maldonado. If you have any questions after today's visit, please call [phone].

## 2018-05-23 NOTE — PROGRESS NOTES
HISTORY OF PRESENT ILLNESS  Gris Adams is a 48 y.o. male. HPI  Hyperlipidemia:  Gris Adams is following up on his dyslipidemia. Cardiovascular risks for him are: LDL goal is under 130. Currently he takes Zocor (simvastatin) ,   Lab Results   Component Value Date/Time    Cholesterol, total 215 (H) 07/01/2016 09:36 AM    HDL Cholesterol 39 (L) 07/01/2016 09:36 AM    LDL, calculated 110 (H) 07/01/2016 09:36 AM    Triglyceride 330 (H) 07/01/2016 09:36 AM     Lab Results   Component Value Date/Time    ALT (SGPT) 27 07/01/2016 09:36 AM    AST (SGOT) 22 07/01/2016 09:36 AM    Alk. phosphatase 57 07/01/2016 09:36 AM    Bilirubin, total 0.4 07/01/2016 09:36 AM       Myalgias: no  Fatigue: no        Gout follow-up  Past gout history: acute gouty arthritis. Current gout treatment: allopurinol (Zyloprim). Side effects of current therapy: none. Progress since last visit:  1 attacks involving the left foot. .  The patient is attempting to avoid high purine foods and alcohol. Lab Results   Component Value Date/Time    Uric acid 7.4 07/01/2016 09:36 AM     Dysthymia - continues SSRI. No complaints of depression/ anxiety. He prefers to stay on med even though originally thought to have situational dysthymia surrounding divorce which is no longer a stressor. Was in ER with angioedema/ lip, oral swelling, throat closing sensation after facial redness following eating a bag of mixed nuts. Avoiding nuts now. No recurrence. He does not have epipen. Patient Active Problem List   Diagnosis Code    Gout M10.9    Hyperlipidemia E78.5    Neck fracture ([facility] [address] S12. 9XXA    Dysthymia F34.1    History of angioedema Z87.898     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     Gout     High cholesterol     Hyperlipidemia 9/5/2014    Kidney stones     Nausea & vomiting     Seizures (HCC)     AS CHILD  Seizures (HCC)     childhood     Allergies   Allergen Reactions    Nut - Unspecified Angioedema     Current Outpatient Prescriptions on File Prior to Visit   Medication Sig Dispense Refill    simvastatin (ZOCOR) 20 mg tablet TAKE 1 TABLET BY MOUTH NIGHTLY. 90 Tab 0    fluocinonide-emollient (LIDEX-E) 0.05 % topical cream Apply  to affected area two (2) times a day. 60 g 1    tretinoin 0.025 % tpical gel APPLY A PEA-SIZED AMOUNT TO FACE EXTERNALLY AT NIGHT  3    omega-3 fatty acids-vitamin e (FISH OIL) 1,000 mg cap Take 4 Caps by mouth daily.  biotin 1 mg tab Take 1 Tab by mouth daily.  MULTIVITAMIN PO Take  by mouth daily. No current facility-administered medications on file prior to visit. Social History   Substance Use Topics    Smoking status: Never Smoker    Smokeless tobacco: Never Used    Alcohol use 0.0 - 0.6 oz/week     0 - 1 Cans of beer per week           ROS    Physical Exam   Constitutional: He appears well-developed and well-nourished. No distress. /76 (BP 1 Location: Left arm, BP Patient Position: Sitting)  Pulse 63  Temp 97.9 °F (36.6 °C) (Oral)   Resp 18  Ht 5' 10\" (1.778 m)  Wt 186 lb 2 oz (84.4 kg)  SpO2 97%  BMI 26.71 kg/m2Body mass index is 26.71 kg/(m^2). HENT:   Mouth/Throat: Oropharynx is clear and moist.   Neck: No JVD present. Carotid bruit is not present. Cardiovascular: Normal rate, regular rhythm, normal heart sounds and intact distal pulses. Pulmonary/Chest: Effort normal and breath sounds normal.   Musculoskeletal: He exhibits no edema. Neurological: He is alert. Skin: Skin is warm and dry. He is not diaphoretic. Nursing note and vitals reviewed. ASSESSMENT and PLAN  Diagnoses and all orders for this visit:    1. Pure hypercholesterolemia - recheck  -     LIPID PANEL  -     METABOLIC PANEL, BASIC    2. Gout, unspecified cause, unspecified chronicity, unspecified site - rare breakthru flares.   -     URIC ACID  - allopurinol (ZYLOPRIM) 300 mg tablet; Take 1 Tab by mouth daily. 3. Dysthymia - Well controlled and stable. his medications were reviewed and refilled where necessary as noted below. Labs ordered as noted. -     citalopram (CELEXA) 40 mg tablet; TAKE 1 TABLET BY MOUTH EVERY DAY    4. History of angioedema -   -     EPINEPHrine (EPIPEN) 0.3 mg/0.3 mL injection; 0.3 mL by IntraMUSCular route once as needed for up to 1 dose.  -     REFERRAL TO ALLERGY    5. Prostate cancer screening  -     PROSTATE SPECIFIC AG    Other orders  -     finasteride (PROSCAR) 5 mg tablet; Take 0.5 Tabs by mouth daily.       Follow-up Disposition:  Return in about 3 months (around 8/23/2018) for physical.

## 2018-05-24 DIAGNOSIS — M10.9 GOUT, UNSPECIFIED CAUSE, UNSPECIFIED CHRONICITY, UNSPECIFIED SITE: Chronic | ICD-10-CM

## 2018-05-24 RX ORDER — ALLOPURINOL 300 MG/1
300 TABLET ORAL DAILY
Qty: 90 TAB | Refills: 1 | Status: SHIPPED | OUTPATIENT
Start: 2018-05-24 | End: 2019-03-27 | Stop reason: SDUPTHER

## 2018-05-24 NOTE — TELEPHONE ENCOUNTER
----- Message from [place] sent at 5/24/2018 10:47 AM EDT -----  Regarding: Dr. Jon Knowles, with [address] is requesting a 90 day supply refill for \"Allopurinol\" 300mg to be sent to Fax number: [phone].  Best contact number for [facility]  Pharmacy: [phone]

## 2018-06-17 LAB
BUN SERPL-MCNC: 22 MG/DL (ref 6–24)
BUN/CREAT SERPL: 23 (ref 9–20)
CALCIUM SERPL-MCNC: 9.4 MG/DL (ref 8.7–10.2)
CHLORIDE SERPL-SCNC: 101 MMOL/L (ref 96–106)
CHOLEST SERPL-MCNC: 184 MG/DL (ref 100–199)
CO2 SERPL-SCNC: 23 MMOL/L (ref 20–29)
CREAT SERPL-MCNC: 0.96 MG/DL (ref 0.76–1.27)
GFR SERPLBLD CREATININE-BSD FMLA CKD-EPI: 105 ML/MIN/1.73
GFR SERPLBLD CREATININE-BSD FMLA CKD-EPI: 91 ML/MIN/1.73
GLUCOSE SERPL-MCNC: 92 MG/DL (ref 65–99)
HDLC SERPL-MCNC: 40 MG/DL
INTERPRETATION, 910389: NORMAL
LDLC SERPL CALC-MCNC: 120 MG/DL (ref 0–99)
POTASSIUM SERPL-SCNC: 4.6 MMOL/L (ref 3.5–5.2)
PSA SERPL-MCNC: 0.2 NG/ML (ref 0–4)
SODIUM SERPL-SCNC: 139 MMOL/L (ref 134–144)
TRIGL SERPL-MCNC: 121 MG/DL (ref 0–149)
URATE SERPL-MCNC: 5.9 MG/DL (ref 3.7–8.6)
VLDLC SERPL CALC-MCNC: 24 MG/DL (ref 5–40)

## 2018-06-18 DIAGNOSIS — L64.9 MALE PATTERN BALDNESS: ICD-10-CM

## 2018-06-18 RX ORDER — FINASTERIDE 5 MG/1
2.5 TABLET, FILM COATED ORAL DAILY
Qty: 45 TAB | Refills: 1 | Status: SHIPPED | OUTPATIENT
Start: 2018-06-18 | End: 2019-01-19 | Stop reason: SDUPTHER

## 2018-06-18 NOTE — TELEPHONE ENCOUNTER
Faxed request received from [facility] for 90 day supply. New prescription sent as prescribed for 90 day supply.

## 2018-07-17 RX ORDER — SIMVASTATIN 20 MG/1
TABLET, FILM COATED ORAL
Qty: 90 TAB | Refills: 0 | Status: SHIPPED | OUTPATIENT
Start: 2018-07-17 | End: 2018-11-17 | Stop reason: SDUPTHER

## 2018-08-10 ENCOUNTER — TELEPHONE (OUTPATIENT)
Dept: INTERNAL MEDICINE CLINIC | Age: 53
End: 2018-08-10

## 2018-08-10 NOTE — TELEPHONE ENCOUNTER
On call service called on back line to report pt call complaining severe back pain. Requesting appointment with Dr. Fransisco Brar today. Advise call service that nurse would return call to pt.

## 2018-08-10 NOTE — TELEPHONE ENCOUNTER
LM informing patient that PCP doesn't have openings today. He can go to urgent care of the emergency room for evaluation and treatment.

## 2019-01-10 ENCOUNTER — HOSPITAL ENCOUNTER (OUTPATIENT)
Dept: GENERAL RADIOLOGY | Age: 54
Discharge: HOME | End: 2019-01-10
Attending: NEUROLOGICAL SURGERY
Payer: COMMERCIAL

## 2019-01-10 DIAGNOSIS — M54.12 BRACHIAL NEURITIS: ICD-10-CM

## 2019-01-10 PROCEDURE — 72050 X-RAY EXAM NECK SPINE 4/5VWS: CPT

## 2019-01-19 DIAGNOSIS — L64.9 MALE PATTERN BALDNESS: ICD-10-CM

## 2019-01-19 RX ORDER — FINASTERIDE 5 MG/1
TABLET, FILM COATED ORAL
Qty: 45 TAB | Refills: 1 | Status: SHIPPED | OUTPATIENT
Start: 2019-01-19 | End: 2019-03-11 | Stop reason: SDUPTHER

## 2019-02-08 ENCOUNTER — HOSPITAL ENCOUNTER (OUTPATIENT)
Dept: PHYSICAL THERAPY | Age: 54
Discharge: HOME | End: 2019-02-08
Payer: COMMERCIAL

## 2019-02-08 PROCEDURE — 97162 PT EVAL MOD COMPLEX 30 MIN: CPT | Performed by: PHYSICAL THERAPY ASSISTANT

## 2019-02-08 PROCEDURE — 97110 THERAPEUTIC EXERCISES: CPT | Performed by: PHYSICAL THERAPY ASSISTANT

## 2019-02-08 PROCEDURE — 97140 MANUAL THERAPY 1/> REGIONS: CPT | Performed by: PHYSICAL THERAPY ASSISTANT

## 2019-02-08 NOTE — PROGRESS NOTES
[address]  Phone: [phone]  Fax: [phone]    Plan of Care/Statement of Necessity for Physical Therapy Services  2-15    Patient name: Alice Shell  : 1965  Provider#: [id number]  Referral source: Mason Young MD      Medical/Treatment Diagnosis: Neck pain on left side [M54.2]     Prior Hospitalization: see medical history     Comorbidities: see chart  Prior Level of Function: tennis  Medications: Verified on Patient Summary List  Start of Care: 21      Onset Date: 2-3 weeks ago   The Plan of Care and following information is based on the information from the initial evaluation. Assessment/ key information: Pt presents w/ L sided neck pain that does not radiate in to UE and has signs and symptoms of 1st rib elevation and upper trap strain. Pt is a good candidate for dry needling in order to address soft tissue limitations.     Evaluation Complexity History MEDIUM  Complexity : 1-2 comorbidities / personal factors will impact the outcome/ POC ; Examination MEDIUM Complexity : 3 Standardized tests and measures addressing body structure, function, activity limitation and / or participation in recreation  ;Presentation LOW Complexity : Stable, uncomplicated  ;Clinical Decision Making MEDIUM Complexity : FOTO score of 26-74  Overall Complexity Rating: MEDIUM    Problem List: pain affecting function, decrease ROM, decrease strength, edema affecting function, decrease ADL/ functional abilitiies, decrease activity tolerance and decrease flexibility/ joint mobility   Treatment Plan may include any combination of the following: Therapeutic exercise, Therapeutic activities, Neuromuscular re-education, Physical agent/modality, Manual therapy, Patient education and Other: dry needling  Patient / Family readiness to learn indicated by: asking questions  Persons(s) to be included in education: patient (P)  Barriers to Learning/Limitations: None  Patient Goal (s): decrease my pain so I can play tennis again  Patient Self Reported Health Status: good  Rehabilitation Potential: good    Short Term Goals: To be accomplished in 2 weeks:  Pt will be independent w/ HEP  Pt will be able to sit w/ proper posture for 20 minutes  Pt will report 50% decrease in pain at rest  Long Term Goals: To be accomplished in 6 weeks:  Pt will report over 15 point improvement on FOTO  Pt will demonstrate full cervical ROM w/o pain  Pt will be able to play tennis w/o pain  Frequency / Duration: Patient to be seen 2 times per week for 6 weeks. Patient/ Caregiver education and instruction: self care    [x]  Plan of care has been reviewed with ABDIAZIZ Rocha, PT, DPT 2/8/2019     ________________________________________________________________________    I certify that the above Therapy Services are being furnished while the patient is under my care. I agree with the treatment plan and certify that this therapy is necessary.     [de-identified] Signature:____________________  Date:____________Time: _________

## 2019-02-08 NOTE — PROGRESS NOTES
PT INITIAL EVALUATION NOTE - [facility] 2-15    Patient Name: Gris Adams  Date:2019  : 1965  [x]  Patient  Verified  Payor: Ericka Capps / Plan: [facility] PPO / Product Type: PPO /    In time:8:30 am  Out time:9:25 am  Total Treatment Time (min): 55  Total Timed Codes (min): 25  1:1 Treatment Time ( only): 25   Visit #: 1     Treatment Area: Neck pain on left side [M54.2]    SUBJECTIVE  Pain Level (0-10 scale): 4  Any medication changes, allergies to medications, adverse drug reactions, diagnosis change, or new procedure performed?: [] No    [x] Yes (see summary sheet for update)  Subjective:    Pt complains of L sided neck pain that started about 2-3 weeks ago after playing tennis. Pt reports he had not played tennis in 10 years and decided to pick it back up again but started feeling neck pain after the 3rd set. Pt has PMH of C6-7 cervical fusion in  [place] but had no lingering effects from this. Pt now has constant pain that increases w/ neck movement. PLOF: desk job, working out at [employer] of Injury: tennis  Previous Treatment/Compliance: good  PMHx/Surgical Hx: C6-7 fusion  Work Hx: desk job- [employer]  Living Situation: w/ son  Pt Goals: \"decrease my pain and return to [employer]"  Barriers: none  Motivation: good  Substance use: none  FABQ Score: see FOTO  Cognition: A & O x 4        OBJECTIVE/EXAMINATION  Posture:   Forward head and rounded shoulders  Other Observations:  Increased kyphosis in thoracic spine  Gait and Functional Mobility:  normal  Palpation: TTP over upper trap, 1st rib elevated, levator scap    Cervical ROM: flexion full w/ pain, extension 40 deg w/ mild pain, R rotation full, L rotation 55 deg w/ pain      UPPER QUARTER   MUSCLE STRENGTH  KEY       R  L  0 - No Contraction  C1, C2 Neck Flex 5  4  1 - Trace   C3 Side Flex  5  4  2 - Poor   C4 Sh Elev  5  5  3 - Fair    C5 Deltoid/Biceps 5  4-  4 - Good   C6 Wrist Ext  5  5  5 - Normal   C7 Triceps  5  5      C8 Thumb Ext  5  5      T1 Hand Inst  5  5          MMT: no pain  Neurological: Reflexes / Sensations: normal  Special Tests: - spurlings, - distraction, + cervical flexion rotation test for L side    Modality rationale: decrease inflammation, decrease pain and increase tissue extensibility to improve the patients ability to turn head   Min Type Additional Details   10 [x] Estim: []Att   [x]Unatt        []TENS instruct                  []IFC  []Premod   [x]NMES                     []Other:  []w/US   []w/ice   [x]w/heat  Position: supine  Location: L upper trap    []  Traction: [] Cervical       []Lumbar                       [] Prone          []Supine                       []Intermittent   []Continuous Lbs:  [] before manual  [] after manual  []w/heat    []  Ultrasound: []Continuous   [] Pulsed at:                           []1MHz   []3MHz Location:  W/cm2:    [] Paraffin         Location:   []w/heat    []  Ice     []  Heat  []  Ice massage Position:  Location:    []  Laser  []  Other: Position:  Location:      []  Vasopneumatic Device Pressure:       [] lo [] med [] hi   Temperature:      [x] Skin assessment post-treatment:  [x]intact []redness- no adverse reaction    []redness - adverse reaction:     15 min Therapeutic Exercise:  [x] See flow sheet :   Rationale: increase ROM, increase strength and improve coordination to improve the patients ability to sit w/ proper posture at work    10 min Manual Therapy: passive R SB and rotation, 1st rib mobs, P/a mobs to thoracic spine grade 2-3, STM to L upper trap and levator scap    Rationale: decrease pain, increase ROM, increase tissue extensibility and decrease trigger points to improve the patients ability to turn head    With   [x] TE   [] TA   [] neuro   [] other: Patient Education: [x] Review HEP    [] Progressed/Changed HEP based on:   [] positioning   [] body mechanics   [] transfers   [] heat/ice application    [] other: Other Objective/Functional Measures:     Pain Level (0-10 scale) post treatment: 2    ASSESSMENT/Changes in Function:     [x]  See Plan of [address] PT, DPT 2/8/2019

## 2019-02-11 ENCOUNTER — HOSPITAL ENCOUNTER (OUTPATIENT)
Dept: PHYSICAL THERAPY | Age: 54
Discharge: HOME | End: 2019-02-11
Payer: COMMERCIAL

## 2019-02-11 PROCEDURE — 97014 ELECTRIC STIMULATION THERAPY: CPT

## 2019-02-11 PROCEDURE — 97110 THERAPEUTIC EXERCISES: CPT

## 2019-02-11 PROCEDURE — 97140 MANUAL THERAPY 1/> REGIONS: CPT

## 2019-02-11 NOTE — PROGRESS NOTES
PT DAILY TREATMENT NOTE - [facility] 2-15    Patient Name: Mario Dior  Date:2019  : 1965  [x]  Patient  Verified  Payor: BLUE CROSS / Plan: Massimo Whatley [id number] PPO / Product Type: PPO /    In time: 5:00p  Out time:6:00p  Total Treatment Time (min): 60  Total Timed Codes (min): 45  1:1 Treatment Time ( only): 45   Visit #:  2    Treatment Area: Neck pain on left side [M54.2]    SUBJECTIVE  Pain Level (0-10 scale): 3  Any medication changes, allergies to medications, adverse drug reactions, diagnosis change, or new procedure performed?: [x] No    [] Yes (see summary sheet for update)  Subjective functional status/changes:   [] No changes reported  Patient reports compliance with HEP. Patient states he has not tnoticed much change yet.     OBJECTIVE    Modality rationale: decrease pain and increase tissue extensibility to improve the patients ability to lift, carry, reach, complete ADL and return to tennis   Min Type Additional Details      15 [x] Estim: []Att   [x]Unatt    []TENS instruct                  [x]IFC  []Premod   []NMES                     []Other:  []w/US   []w/ice   []w/heat  Position:  Location:       []  Traction: [] Cervical       []Lumbar                       [] Prone          []Supine                       []Intermittent   []Continuous Lbs:  [] before manual  [] after manual  []w/heat    []  Ultrasound: []Continuous   [] Pulsed                       at: []1MHz   []3MHz Location:  W/cm2:    [] Paraffin         Location:   []w/heat    []  Ice     []  Heat  []  Ice massage Position:  Location:    []  Laser  []  Other: Position:  Location:      []  Vasopneumatic Device Pressure:       [] lo [] med [] hi   Temperature:      [x] Skin assessment post-treatment:  [x]intact []redness- no adverse reaction    []redness - adverse reaction:     30 min Therapeutic Exercise:  [x] See flow sheet :   Rationale: increase ROM and increase strength to improve the patients ability to lift, carry, reach, complete ADL and return to tennis    15 min Manual Therapy: MFR L UT, levator, Rhomboids, scapular gliding all directions    Rationale: decrease pain, increase ROM, increase tissue extensibility and decrease trigger points to improve the patients ability to lift, carry, reach, complete ADL and return to tennis      With   [] TE   [] TA   [] neuro   [] other: Patient Education: [x] Review HEP    [] Progressed/Changed HEP based on:   [] positioning   [] body mechanics   [] transfers   [] heat/ice application    [] other:      Other Objective/Functional Measures: mod verbal cues for postural awareness with interventions, increased fatigue noted with pull downs today     Pain Level (0-10 scale) post treatment: 2    ASSESSMENT/Changes in Function:       Patient will continue to benefit from skilled PT services to modify and progress therapeutic interventions, address functional mobility deficits, address ROM deficits, address strength deficits, analyze and address soft tissue restrictions, analyze and cue movement patterns, analyze and modify body mechanics/ergonomics and assess and modify postural abnormalities to attain remaining goals. []  See Plan of Care  []  See progress note/recertification  []  See Discharge Summary         Progress towards goals / Updated goals:  Patient demonstrates good tolerance for advanced interventions with increased fatigue noted. Patient will do well with continued progression as tolerated to return to sport with no pain.     PLAN  [x]  Upgrade activities as tolerated     [x]  Continue plan of care  [x]  Update interventions per flow sheet       []  Discharge due to:_  []  Other:_      Bhanu Gonzalez 2/11/2019

## 2019-02-13 ENCOUNTER — HOSPITAL ENCOUNTER (OUTPATIENT)
Dept: PHYSICAL THERAPY | Age: 54
Discharge: HOME | End: 2019-02-13
Payer: COMMERCIAL

## 2019-02-13 PROCEDURE — 97014 ELECTRIC STIMULATION THERAPY: CPT | Performed by: PHYSICAL MEDICINE & REHABILITATION

## 2019-02-13 PROCEDURE — 97110 THERAPEUTIC EXERCISES: CPT | Performed by: PHYSICAL MEDICINE & REHABILITATION

## 2019-02-13 PROCEDURE — 97140 MANUAL THERAPY 1/> REGIONS: CPT | Performed by: PHYSICAL MEDICINE & REHABILITATION

## 2019-02-13 NOTE — PROGRESS NOTES
PT DAILY TREATMENT NOTE - [facility] 2-15    Patient Name: Diane Bishop  Date:2019  : 1965  [x]  Patient  Verified  Payor: BLUE CROSS / Plan: Massimo Whatley [id number] PPO / Product Type: PPO /    In time:6:00PM  Out time:7:00PM  Total Treatment Time (min): 60  Total Timed Codes (min): 45  1:1 Treatment Time ( only): 30   Visit #: 3      Treatment Area: Neck pain on left side [M54.2]    SUBJECTIVE  Pain Level (0-10 scale): 2-3/10  Any medication changes, allergies to medications, adverse drug reactions, diagnosis change, or new procedure performed?: [x] No    [] Yes (see summary sheet for update)  Subjective functional status/changes:   [] No changes reported  Pt c/o pain in L upper trap. Pt states that stretching helps alleviate the pain. Pt states he would like to start playing tennis again once the pain stops. OBJECTIVE    Modality rationale: decrease edema, decrease inflammation, decrease pain and increase tissue extensibility to improve the patients ability to increase ADL efficiency.    Type Additional Details   [x] Estim: []Att   [x]Unatt        []TENS instruct                  [x]IFC  []Premod   []NMES                     []Other:  []w/US   []w/ice   [x]w/heat  Position: Sitting  Location: Cervical   []  Traction: [] Cervical       []Lumbar                       [] Prone          []Supine                       []Intermittent   []Continuous Lbs:  [] before manual  [] after manual  []w/heat   []  Ultrasound: []Continuous   [] Pulsed at:                           []1MHz   []3MHz Location:  W/cm2:   [] Paraffin         Location:   []w/heat   []  Ice     []  Heat  []  Ice massage Position:  Location:   []  Laser  []  Other: Position:  Location:   []  Vasopneumatic Device Pressure:       [] lo [] med [] hi   Temperature:      [x] Skin assessment post-treatment:  [x]intact []redness- no adverse reaction    []redness - adverse reaction:     35 min Therapeutic Exercise:  [x] See flow sheet :   Rationale: increase ROM, increase strength, improve coordination, improve balance and increase proprioception to improve the patients ability to increase ADL efficiency. 10 min Manual Therapy:  STM to L upper trap   Rationale: decrease pain, increase ROM, increase tissue extensibility, decrease edema , correct positional vertigo and decrease trigger points to improve the patients ability to increase ADL efficiency. With   [x] TE   [] TA   [] neuro   [] other: Patient Education: [x] Review HEP    [] Progressed/Changed HEP based on:   [] positioning   [] body mechanics   [] transfers   [] heat/ice application    [] other:      Other Objective/Functional Measures: Pt able to complete all exercises with no increase in pain. Updated HEP with UE strengthening exercises. Pain Level (0-10 scale) post treatment: \"better\"    ASSESSMENT/Changes in Function:     Patient will continue to benefit from skilled PT services to modify and progress therapeutic interventions, address functional mobility deficits, address ROM deficits, address strength deficits, analyze and address soft tissue restrictions, analyze and cue movement patterns, analyze and modify body mechanics/ergonomics, assess and modify postural abnormalities and address imbalance/dizziness to attain remaining goals. []  See Plan of Care  []  See progress note/recertification  []  See Discharge Summary         Progress towards goals / Updated goals:  Pt tolerates PT well and is progressing towards his STG/LTG. Pt will become independent with HEP by his next visit.     PLAN  [x]  Upgrade activities as tolerated     [x]  Continue plan of care  [x]  Update interventions per flow sheet       []  Discharge due to:_  []  Other:_      PRATIBHA Fierro 2/13/2019     1 MT  1 TE  1 ES

## 2019-02-18 ENCOUNTER — HOSPITAL ENCOUNTER (OUTPATIENT)
Dept: PHYSICAL THERAPY | Age: 54
Discharge: HOME | End: 2019-02-18
Payer: COMMERCIAL

## 2019-02-18 PROCEDURE — 97140 MANUAL THERAPY 1/> REGIONS: CPT | Performed by: PHYSICAL MEDICINE & REHABILITATION

## 2019-02-18 PROCEDURE — 97110 THERAPEUTIC EXERCISES: CPT | Performed by: PHYSICAL MEDICINE & REHABILITATION

## 2019-02-18 PROCEDURE — 97014 ELECTRIC STIMULATION THERAPY: CPT | Performed by: PHYSICAL MEDICINE & REHABILITATION

## 2019-02-18 NOTE — PROGRESS NOTES
PT DAILY TREATMENT NOTE - [facility] 2-15    Patient Name: Agus Cobb  Date:2019  : 1965  [x]  Patient  Verified  Payor: BLUE CROSS / Plan: Massimo Whatley [id number] PPO / Product Type: PPO /    In time:525PM  Out time:625PM  Total Treatment Time (min): 60  Total Timed Codes (min): 45  1:1 Treatment Time ( only): 40   Visit #: 4      Treatment Area: Neck pain on left side [M54.2]    SUBJECTIVE  Pain Level (0-10 scale): 2/10  Any medication changes, allergies to medications, adverse drug reactions, diagnosis change, or new procedure performed?: [x] No    [] Yes (see summary sheet for update)  Subjective functional status/changes:   [] No changes reported  Pt reports that his neck is getting better. Patient stated he isn't having any trouble sleeping. OBJECTIVE    Modality rationale: decrease edema, decrease inflammation, decrease pain and increase tissue extensibility to improve the patients ability to increase ADL efficiency.    Type Additional Details   [x] Estim: []Att   [x]Unatt        []TENS instruct                  [x]IFC  []Premod   []NMES                     []Other:  []w/US   []w/ice   [x]w/heat  Position: Sitting  Location: Cervical   []  Traction: [] Cervical       []Lumbar                       [] Prone          []Supine                       []Intermittent   []Continuous Lbs:  [] before manual  [] after manual  []w/heat   []  Ultrasound: []Continuous   [] Pulsed at:                           []1MHz   []3MHz Location:  W/cm2:   [] Paraffin         Location:   []w/heat   []  Ice     []  Heat  []  Ice massage Position:  Location:   []  Laser  []  Other: Position:  Location:   []  Vasopneumatic Device Pressure:       [] lo [] med [] hi   Temperature:      [x] Skin assessment post-treatment:  [x]intact []redness- no adverse reaction    []redness - adverse reaction:     35 min Therapeutic Exercise:  [x] See flow sheet :   Rationale: increase ROM, increase strength, improve coordination, improve balance and increase proprioception to improve the patients ability to increase ADL efficiency. 10 min Manual Therapy:  STM to L upper trap   Rationale: decrease pain, increase ROM, increase tissue extensibility, decrease edema , correct positional vertigo and decrease trigger points to improve the patients ability to increase ADL efficiency. With   [x] TE   [] TA   [] neuro   [] other: Patient Education: [x] Review HEP    [] Progressed/Changed HEP based on:   [] positioning   [] body mechanics   [] transfers   [] heat/ice application    [] other:      Other Objective/Functional Measures: Mod fatigue with today's new exercises but no increase in pain. Pain Level (0-10 scale) post treatment: 0/10    ASSESSMENT/Changes in Function:     Patient will continue to benefit from skilled PT services to modify and progress therapeutic interventions, address functional mobility deficits, address ROM deficits, address strength deficits, analyze and address soft tissue restrictions, analyze and cue movement patterns, analyze and modify body mechanics/ergonomics, assess and modify postural abnormalities and address imbalance/dizziness to attain remaining goals. []  See Plan of Care  []  See progress note/recertification  []  See Discharge Summary         Progress towards goals / Updated goals:  Pt is showing overall good progress towards goals but continues to be limited by fatigue and needs mod verbal cues to prevent UT and levator activation during exercises.     PLAN  [x]  Upgrade activities as tolerated     [x]  Continue plan of care  [x]  Update interventions per flow sheet       []  Discharge due to:_  []  Other:_      Emma Chatterjee PTA, CPT 2/18/2019

## 2019-02-19 ENCOUNTER — PATIENT MESSAGE (OUTPATIENT)
Dept: INTERNAL MEDICINE CLINIC | Age: 54
End: 2019-02-19

## 2019-02-19 ENCOUNTER — HOSPITAL ENCOUNTER (OUTPATIENT)
Dept: PHYSICAL THERAPY | Age: 54
End: 2019-02-19
Payer: COMMERCIAL

## 2019-02-19 DIAGNOSIS — F34.1 DYSTHYMIA: ICD-10-CM

## 2019-02-19 NOTE — TELEPHONE ENCOUNTER
Per chart review, patient no showed his last visit with Dr. Kary Skiff and canceled several appointments prior to that. Will have RN call and discuss plans for new PCP. Can send in a refill to get through until that appointment.

## 2019-02-20 RX ORDER — CITALOPRAM 40 MG/1
TABLET, FILM COATED ORAL
Qty: 90 TAB | Refills: 1 | OUTPATIENT
Start: 2019-02-20

## 2019-02-21 ENCOUNTER — HOSPITAL ENCOUNTER (OUTPATIENT)
Dept: PHYSICAL THERAPY | Age: 54
Discharge: HOME | End: 2019-02-21
Payer: COMMERCIAL

## 2019-02-21 PROCEDURE — 97014 ELECTRIC STIMULATION THERAPY: CPT

## 2019-02-21 PROCEDURE — 97110 THERAPEUTIC EXERCISES: CPT

## 2019-02-21 PROCEDURE — 97140 MANUAL THERAPY 1/> REGIONS: CPT

## 2019-02-21 NOTE — PROGRESS NOTES
PT DAILY TREATMENT NOTE - [facility] 2-15    Patient Name: Gris Adams  Date:2019  : 1965  [x]  Patient  Verified  Payor: BLUE HELEN / Plan: Massimo Whatley [id number] PPO / Product Type: PPO /    In time:6:05p  Out time:7:05  Total Treatment Time (min): 60  Total Timed Codes (min): 45  1:1 Treatment Time ( only): 40   Visit #: 5      Treatment Area: Neck pain on left side [M54.2]    SUBJECTIVE  Pain Level (0-10 scale): 2/10  Any medication changes, allergies to medications, adverse drug reactions, diagnosis change, or new procedure performed?: [x] No    [] Yes (see summary sheet for update)  Subjective functional status/changes:   [] No changes reported  Patient reports no issues since last session and continues to have some pain along shoulder down into lateral border of scapula. OBJECTIVE    Modality rationale: decrease edema, decrease inflammation, decrease pain and increase tissue extensibility to improve the patients ability to increase ADL efficiency.    Type Additional Details   [x] Estim: []Att   [x]Unatt        []TENS instruct                  [x]IFC  []Premod   []NMES                     []Other:  []w/US   []w/ice   [x]w/heat  Position: Sitting  Location: Cervical   []  Traction: [] Cervical       []Lumbar                       [] Prone          []Supine                       []Intermittent   []Continuous Lbs:  [] before manual  [] after manual  []w/heat   []  Ultrasound: []Continuous   [] Pulsed at:                           []1MHz   []3MHz Location:  W/cm2:   [] Paraffin         Location:   []w/heat   []  Ice     []  Heat  []  Ice massage Position:  Location:   []  Laser  []  Other: Position:  Location:   []  Vasopneumatic Device Pressure:       [] lo [] med [] hi   Temperature:      [x] Skin assessment post-treatment:  [x]intact []redness- no adverse reaction    []redness - adverse reaction:     35 min Therapeutic Exercise:  [x] See flow sheet :   Rationale: increase ROM, increase strength, improve coordination, improve balance and increase proprioception to improve the patients ability to increase ADL efficiency. 10 min Manual Therapy:  STM to L upper trap   Rationale: decrease pain, increase ROM, increase tissue extensibility, decrease edema , correct positional vertigo and decrease trigger points to improve the patients ability to increase ADL efficiency. With   [x] TE   [] TA   [] neuro   [] other: Patient Education: [x] Review HEP    [] Progressed/Changed HEP based on:   [] positioning   [] body mechanics   [] transfers   [] heat/ice application    [] other:      Other Objective/Functional Measures: vc for decreased UT use required       Pain Level (0-10 scale) post treatment: 0/10    ASSESSMENT/Changes in Function:     Patient will continue to benefit from skilled PT services to modify and progress therapeutic interventions, address functional mobility deficits, address ROM deficits, address strength deficits, analyze and address soft tissue restrictions, analyze and cue movement patterns, analyze and modify body mechanics/ergonomics, assess and modify postural abnormalities and address imbalance/dizziness to attain remaining goals. []  See Plan of Care  []  See progress note/recertification  []  See Discharge Summary         Progress towards goals / Updated goals:  Patient demonstrates improved tolerance for interventions with verbal cues for proper exercise reproduction and decreased compensation strategies.        PLAN  [x]  Upgrade activities as tolerated     [x]  Continue plan of care  [x]  Update interventions per flow sheet       []  Discharge due to:_  []  Other:_      Checo Leung, PTA 2/21/2019

## 2019-03-11 DIAGNOSIS — F34.1 DYSTHYMIA: ICD-10-CM

## 2019-03-11 DIAGNOSIS — L64.9 MALE PATTERN BALDNESS: ICD-10-CM

## 2019-03-11 RX ORDER — SIMVASTATIN 20 MG/1
TABLET, FILM COATED ORAL
Qty: 90 TAB | Refills: 0 | Status: SHIPPED | OUTPATIENT
Start: 2019-03-11 | End: 2019-03-27 | Stop reason: SDUPTHER

## 2019-03-11 RX ORDER — FINASTERIDE 5 MG/1
TABLET, FILM COATED ORAL
Qty: 45 TAB | Refills: 0 | Status: SHIPPED | OUTPATIENT
Start: 2019-03-11 | End: 2019-11-26 | Stop reason: SDUPTHER

## 2019-03-11 RX ORDER — CITALOPRAM 40 MG/1
TABLET, FILM COATED ORAL
Qty: 90 TAB | Refills: 0 | Status: SHIPPED | OUTPATIENT
Start: 2019-03-11 | End: 2019-03-27 | Stop reason: SDUPTHER

## 2019-03-11 NOTE — TELEPHONE ENCOUNTER
Last visit noted, labs checked and refill deemed appropriate at this time. Verbal refill order authorized by covering physicians at [facility] for Dr. Harper Candelario during his absence.     Scheduled with Dr. [facility] on 3/27/19    Chago Santizo, JoeyD, BCPS, CDE

## 2019-03-11 NOTE — TELEPHONE ENCOUNTER
----- Message from Neri Scott sent at 3/11/2019 12:04 PM EDT -----  Regarding: Dr. Meir Hu   Pt is requesting refills for \"Simvastatin\", \"Cytlapram\" and \"Finasteride\". [facility] at [address] [place] [address]. Best contact [phone].

## 2019-03-27 ENCOUNTER — OFFICE VISIT (OUTPATIENT)
Dept: INTERNAL MEDICINE CLINIC | Age: 54
End: 2019-03-27

## 2019-03-27 VITALS
DIASTOLIC BLOOD PRESSURE: 82 MMHG | SYSTOLIC BLOOD PRESSURE: 125 MMHG | OXYGEN SATURATION: 97 % | HEART RATE: 61 BPM | HEIGHT: 70 IN | WEIGHT: 186.25 LBS | RESPIRATION RATE: 18 BRPM | TEMPERATURE: 98.2 F | BODY MASS INDEX: 26.66 KG/M2

## 2019-03-27 DIAGNOSIS — M10.9 GOUT, UNSPECIFIED CAUSE, UNSPECIFIED CHRONICITY, UNSPECIFIED SITE: Chronic | ICD-10-CM

## 2019-03-27 DIAGNOSIS — E78.00 PURE HYPERCHOLESTEROLEMIA: ICD-10-CM

## 2019-03-27 DIAGNOSIS — F34.1 DYSTHYMIA: ICD-10-CM

## 2019-03-27 DIAGNOSIS — N52.9 ED (ERECTILE DYSFUNCTION) OF ORGANIC ORIGIN: Primary | ICD-10-CM

## 2019-03-27 RX ORDER — CITALOPRAM 40 MG/1
TABLET, FILM COATED ORAL
Qty: 90 TAB | Refills: 1 | Status: SHIPPED | OUTPATIENT
Start: 2019-03-27 | End: 2020-01-22

## 2019-03-27 RX ORDER — ALLOPURINOL 300 MG/1
300 TABLET ORAL DAILY
Qty: 90 TAB | Refills: 1 | Status: SHIPPED | OUTPATIENT
Start: 2019-03-27 | End: 2020-01-25

## 2019-03-27 RX ORDER — ZOSTER VACCINE RECOMBINANT, ADJUVANTED 50 MCG/0.5
0.5 KIT INTRAMUSCULAR ONCE
Qty: 0.5 ML | Refills: 1 | Status: SHIPPED | OUTPATIENT
Start: 2019-03-27 | End: 2019-03-27

## 2019-03-27 RX ORDER — SIMVASTATIN 20 MG/1
20 TABLET, FILM COATED ORAL
Qty: 90 TAB | Refills: 1 | Status: SHIPPED | OUTPATIENT
Start: 2019-03-27 | End: 2019-10-26 | Stop reason: SDUPTHER

## 2019-03-27 RX ORDER — SILDENAFIL CITRATE 20 MG/1
TABLET ORAL
Qty: 30 TAB | Refills: 2 | Status: SHIPPED | OUTPATIENT
Start: 2019-03-27 | End: 2019-06-06 | Stop reason: SDUPTHER

## 2019-03-27 NOTE — PROGRESS NOTES
HISTORY OF PRESENT ILLNESS Presents for follow-up. Patient of Dr. Sylwia Steele. Presents to establish care with me temporarily while Dr. Sylwia Steele is on medical leave. His sister-in-law is Dr. Kathryn Sousa He works at [employer] since 1/2012. He is . Has 1 son who will be graduating high school this year and is applying to colleges although was rejected from [employer] today and is disappointed. He has no acute concerns today. He did have a neck injury playing tennis. He saw Dr. David Jacques 1/2019. X-ray at that time showed anterior plate and screw fixation of C6/7, degenerative change throughout the cervical spine with no clear other etiology. He was referred to physical therapy which he is doing and that has helped some. History of chronic dysthymia and anxiety. Has been taking Celexa for quite some time. Feels that he is doing overall fairly well but is nervous about his son going to college and is not sure he wants to cut back on medication. Reports some erectile dysfunction. Decreased rigidity and longevity. Gout . Taking allopurinol. No recent flares. Review of Systems All other systems reviewed and are negative, except as noted in HPI Past Medical and Surgical History 
 has a past medical history of Cervical spine fracture ([facility] [address] (09/05/2014), Dysthymia (05/23/2018), Elective hair transplant for purposes other than remedying health states, Gout, History of angioedema (05/23/2018), History of seizures as a child, Hyperlipidemia (9/5/2014), Male pattern baldness (05/23/2018), and Nephrolith. has a past surgical history that includes hx urological; hx cervical fusion (20014); hx refractive surgery; and hx colonoscopy (02/15/2019). reports that he has never smoked. He has never used smokeless tobacco. He reports that he drinks alcohol. He reports that he does not use drugs.  
family history includes Anxiety in his brother, father, mother, and sister; Cancer in his father and mother; Other in his brother. Physical Exam  
Nursing note and vitals reviewed. Blood pressure 145/76, pulse 61, temperature 98.2 °F (36.8 °C), temperature source Oral, resp. rate 18, height 5' 10\" (1.778 m), weight 186 lb 4 oz (84.5 kg), SpO2 97 %. Constitutional:  No distress. Eyes: Conjunctivae are normal.  
Ears:  Hearing grossly intact Cardiovascular: Normal rate. regular rhythm, no murmurs or gallops No edema Pulmonary/Chest: Effort normal.   CTAB Musculoskeletal: moves all 4 extremities Neurological: Alert and oriented to person, place, and time. Skin: No rash noted. Psychiatric: Normal mood and affect. Behavior is normal.  
 
ASSESSMENT and PLAN Diagnoses and all orders for this visit: 1. ED (erectile dysfunction) of organic origin Trial of medical therapy. Side effects discussed. Citalopram may affect sexual function, but he is been taking this for a long time. -     sildenafil, antihypertensive, (REVATIO) 20 mg tablet; Take 3-5 pills once daily as needed 2. Dysthymia Controlled on current regimen. Continue current medications as written in chart. -     citalopram (CELEXA) 40 mg tablet; TAKE 1 TABLET BY MOUTH EVERY DAY 3. Pure hypercholesterolemia Controlled on current regimen. Continue current medications as written in chart. -     simvastatin (ZOCOR) 20 mg tablet; Take 1 Tab by mouth nightly. TAKE 1 TABLET BY MOUTH EVERY DAY AT NIGHT 4. Gout, unspecified cause, unspecified chronicity, unspecified site Currently asymptomatic 
-     allopurinol (ZYLOPRIM) 300 mg tablet; Take 1 Tab by mouth daily. Other orders 
-     SHINGRIX, PF, 50 mcg/0.5 mL susr injection; 0.5 mL by IntraMUSCular route once for 1 dose. Receive 2nd dose in 2-6 months. For Shingles (Zoster) prevention 
 
lab results and schedule of future lab studies reviewed with patient 
reviewed medications and side effects in detail Return to clinic for further evaluation if new symptoms develop He is due for follow-up labs and physical in summer 2019. Advised to call in May or June to see if Dr. Sarah Cabrera is back. If not, I am happy to see him. Current Outpatient Medications Medication Sig  
 SHINGRIX, PF, 50 mcg/0.5 mL susr injection 0.5 mL by IntraMUSCular route once for 1 dose. Receive 2nd dose in 2-6 months. For Shingles (Zoster) prevention  sildenafil, antihypertensive, (REVATIO) 20 mg tablet Take 3-5 pills once daily as needed  citalopram (CELEXA) 40 mg tablet TAKE 1 TABLET BY MOUTH EVERY DAY  simvastatin (ZOCOR) 20 mg tablet Take 1 Tab by mouth nightly. TAKE 1 TABLET BY MOUTH EVERY DAY AT NIGHT  allopurinol (ZYLOPRIM) 300 mg tablet Take 1 Tab by mouth daily.  finasteride (PROSCAR) 5 mg tablet TAKE 1/2 TABLET DAILY  EPINEPHrine (EPIPEN) 0.3 mg/0.3 mL injection 0.3 mL by IntraMUSCular route once as needed for up to 1 dose.  fluocinonide-emollient (LIDEX-E) 0.05 % topical cream Apply  to affected area two (2) times a day.  tretinoin 0.025 % tpical gel APPLY A PEA-SIZED AMOUNT TO FACE EXTERNALLY AT NIGHT  omega-3 fatty acids-vitamin e (FISH OIL) 1,000 mg cap Take 4 Caps by mouth daily.  biotin 1 mg tab Take 1 Tab by mouth daily.  MULTIVITAMIN PO Take  by mouth daily. No current facility-administered medications for this visit. Discussed the patient's BMI with him. The BMI follow up plan is as follows:  
 
dietary management education, guidance, and counseling 
encourage exercise 
monitor weight 
prescribed dietary intake An After Visit Summary was printed and given to the patient.

## 2019-04-29 ENCOUNTER — HOSPITAL ENCOUNTER (OUTPATIENT)
Dept: PHYSICAL THERAPY | Age: 54
Discharge: HOME | End: 2019-04-29
Payer: COMMERCIAL

## 2019-04-29 PROCEDURE — 97112 NEUROMUSCULAR REEDUCATION: CPT | Performed by: PHYSICAL THERAPIST

## 2019-04-29 PROCEDURE — 97140 MANUAL THERAPY 1/> REGIONS: CPT | Performed by: PHYSICAL THERAPIST

## 2019-04-29 PROCEDURE — 97014 ELECTRIC STIMULATION THERAPY: CPT | Performed by: PHYSICAL THERAPIST

## 2019-04-29 NOTE — PROGRESS NOTES
PT DAILY TREATMENT NOTE - [facility] 2-15    Patient Name: Ramos Kaye  Date:2019  : 1965  [x]  Patient  Verified  Payor: BLUE CROSS / Plan: Massimo Whatley [id number] PPO / Product Type: PPO /    In time:10:20 AM Out time:11:10 AM  Total Treatment Time (min): 60  Total Timed Codes (min): 45  1:1 Treatment Time ( only): 40   Visit #: 6      Treatment Area: Neck pain on left side [M54.2]    SUBJECTIVE  Pain Level (0-10 scale): 210  Any medication changes, allergies to medications, adverse drug reactions, diagnosis change, or new procedure performed?: [x] No    [] Yes (see summary sheet for update)  Subjective functional status/changes:   [] No changes reported  Patient returns to the clinic following an 8 week absence. He has had difficulty maintaining improvement at his L upper trapezius with periods of pain relief followed days where he notices no change. He wishes to trial DN. OBJECTIVE    Modality rationale: decrease edema, decrease inflammation, decrease pain and increase tissue extensibility to improve the patients ability to increase ADL efficiency.    Type Additional Details   [x] Estim: []Att   [x]Unatt        []TENS instruct                  [x]IFC  []Premod   []NMES                     []Other:  []w/US   []w/ice   [x]w/heat  Position: Sitting  Location: Cervical   []  Traction: [] Cervical       []Lumbar                       [] Prone          []Supine                       []Intermittent   []Continuous Lbs:  [] before manual  [] after manual  []w/heat   []  Ultrasound: []Continuous   [] Pulsed at:                           []1MHz   []3MHz Location:  W/cm2:   [] Paraffin         Location:   []w/heat   []  Ice     []  Heat  []  Ice massage Position:  Location:   []  Laser  []  Other: Position:  Location:   []  Vasopneumatic Device Pressure:       [] lo [] med [] hi   Temperature:      [x] Skin assessment post-treatment:  [x]intact []redness- no adverse reaction    []redness - adverse reaction: 10 min Neuromuscular Re-education:  [x] See flow sheet :   Rationale: increase ROM, increase strength, improve coordination, improve balance and increase proprioception to improve the patients ability to increase ADL efficiency. 30 min Manual Therapy:    DN was performed in prone to the left upper trapezius, beginning at the CT junction and extending laterally towards the mid-scapular line. 4 x 50 mm x .30 mm needles were inserted in a superolateral direction with pistoning performed to elicit LTR's and provide an optimal pain reduction. DN was performed prior to the following manual therapy techniques to allow greater improvements in ROM:  Passive stretching of the L upper trapezius  Grade III T1-T2 mobilizations in P/A direction  DEREK rib pumping, L AIC   Rationale: decrease pain, increase ROM, increase tissue extensibility, decrease edema , correct positional vertigo and decrease trigger points to improve the patients ability to increase ADL efficiency. With   [x] TE   [] TA   [] neuro   [] other: Patient Education: [x] Review HEP    [] Progressed/Changed HEP based on:   [] positioning   [] body mechanics   [] transfers   [] heat/ice application    [] other:      Other Objective/Functional Measures:   Apical expansion and posterior mediastinum expansion limited B  Increased reliance on anterior neck and abdominal breathing patterns     Mild improvement in both regions at the end of session    Pain Level (0-10 scale) post treatment: 0/10    ASSESSMENT/Changes in Function:     Patient will continue to benefit from skilled PT services to modify and progress therapeutic interventions, address functional mobility deficits, address ROM deficits, address strength deficits, analyze and address soft tissue restrictions, analyze and cue movement patterns, analyze and modify body mechanics/ergonomics, assess and modify postural abnormalities and address imbalance/dizziness to attain remaining goals.      [] See Plan of Care  []  See progress note/recertification  []  See Discharge Summary         Progress towards goals / Updated goals:  Patient responded very well to an introduction of DN and DEREK manual and non-manual inhibition techniques. Will continue to utilize as needed to allow for the achievement of all long term goals. PLAN  [x]  Upgrade activities as tolerated     [x]  Continue plan of care  [x]  Update interventions per flow sheet       []  Discharge due to:_  []  Other:_      Kelli Nurse, PT  , DPT, OCS, Cert.  DN   4/29/2019

## 2019-05-06 ENCOUNTER — HOSPITAL ENCOUNTER (OUTPATIENT)
Dept: PHYSICAL THERAPY | Age: 54
Discharge: HOME | End: 2019-05-06
Payer: COMMERCIAL

## 2019-05-06 PROCEDURE — 97014 ELECTRIC STIMULATION THERAPY: CPT | Performed by: PHYSICAL THERAPIST

## 2019-05-06 PROCEDURE — 97112 NEUROMUSCULAR REEDUCATION: CPT | Performed by: PHYSICAL THERAPIST

## 2019-05-06 PROCEDURE — 97140 MANUAL THERAPY 1/> REGIONS: CPT | Performed by: PHYSICAL THERAPIST

## 2019-05-06 NOTE — PROGRESS NOTES
PT DAILY TREATMENT NOTE - [facility] 2-15    Patient Name: Joel Black  Date:2019  : 1965  [x]  Patient  Verified  Payor: [place] / Plan: [facility] PPO / Product Type: PPO /    In time:6:55 AM Out time:7:45 AM  Total Treatment Time (min):50  Total Timed Codes (min): 40  1:1 Treatment Time ( only): 40   Visit #: 7      Treatment Area: Neck pain on left side [M54.2]    SUBJECTIVE  Pain Level (0-10 scale): 2/10  Any medication changes, allergies to medications, adverse drug reactions, diagnosis change, or new procedure performed?: [x] No    [] Yes (see summary sheet for update)  Subjective functional status/changes:   [] No changes reported  Patient reports several days of pain relief after his first visit, but the neck started to tighten up at work and he has returned to a similar level of symptoms as before. OBJECTIVE    Modality rationale: decrease edema, decrease inflammation, decrease pain and increase tissue extensibility to improve the patients ability to increase ADL efficiency.    Type Additional Details   [x] Estim: []Att   [x]Unatt        []TENS instruct                  []IFC  []Premod   []NMES                     [x]Other: asymmetrical, biphasic, 2 Hz, 300 usec []w/US   []w/ice   []w/heat  Position: prone  Location: left upper trapezius, levator scapulae   []  Traction: [] Cervical       []Lumbar                       [] Prone          []Supine                       []Intermittent   []Continuous Lbs:  [] before manual  [] after manual  []w/heat   []  Ultrasound: []Continuous   [] Pulsed at:                           []1MHz   []3MHz Location:  W/cm2:   [] Paraffin         Location:   []w/heat   []  Ice     []  Heat  []  Ice massage Position:  Location:   []  Laser  []  Other: Position:  Location:   []  Vasopneumatic Device Pressure:       [] lo [] med [] hi   Temperature:      [x] Skin assessment post-treatment:  [x]intact []redness- no adverse reaction    []redness - adverse reaction:     10 min Neuromuscular Re-education:  [x] See flow sheet :   Rationale: increase ROM, increase strength, improve coordination, improve balance and increase proprioception to improve the patients ability to increase ADL efficiency. 30 min Manual Therapy:    DN was performed in prone to the left upper trapezius, beginning at the CT junction and extending laterally towards the mid-scapular line. 4 x 50 mm x .30 mm needles were inserted in a superolateral direction with pistoning performed to elicit LTR's and provide an optimal pain reduction. Needles were then inserted into the scapular attachment of levator scapulae, multifidi at T1-T3. Bony contact was made at each of these 4 points to ensure safety. DN was performed prior to the following manual therapy techniques to allow greater improvements in ROM:  Passive stretching of the L upper trapezius  Grade III T1-T2 mobilizations in P/A direction  DEREK rib pumping, L AIC   Rationale: decrease pain, increase ROM, increase tissue extensibility, decrease edema , correct positional vertigo and decrease trigger points to improve the patients ability to increase ADL efficiency.        With   [x] TE   [] TA   [] neuro   [] other: Patient Education: [x] Review HEP    [] Progressed/Changed HEP based on:   [] positioning   [] body mechanics   [] transfers   [] heat/ice application    [] other:      Other Objective/Functional Measures:   Apical expansion and posterior mediastinum expansion continue to be limited B    Pain Level (0-10 scale) post treatment: 0/10    ASSESSMENT/Changes in Function:     Patient will continue to benefit from skilled PT services to modify and progress therapeutic interventions, address functional mobility deficits, address ROM deficits, address strength deficits, analyze and address soft tissue restrictions, analyze and cue movement patterns, analyze and modify body mechanics/ergonomics, assess and modify postural abnormalities and address imbalance/dizziness to attain remaining goals. []  See Plan of Care  []  See progress note/recertification  []  See Discharge Summary         Progress towards goals / Updated goals:  Patient has not seen significant functional change since his last visit, but will continue to utilize current interventions to allow for decreased pain, improved ROM, and decreased segmental stiffness at the upper thoracic spine. PLAN  [x]  Upgrade activities as tolerated     [x]  Continue plan of care  [x]  Update interventions per flow sheet       []  Discharge due to:_  []  Other:_      Liz Arias, PT  , DPT, OCS, Cert.  DN   5/6/2019

## 2019-05-14 ENCOUNTER — APPOINTMENT (OUTPATIENT)
Dept: PHYSICAL THERAPY | Age: 54
End: 2019-05-14
Payer: COMMERCIAL

## 2019-05-21 ENCOUNTER — APPOINTMENT (OUTPATIENT)
Dept: PHYSICAL THERAPY | Age: 54
End: 2019-05-21
Payer: COMMERCIAL

## 2019-06-05 ENCOUNTER — HOSPITAL ENCOUNTER (OUTPATIENT)
Dept: MRI IMAGING | Age: 54
Discharge: HOME | End: 2019-06-05
Attending: NURSE PRACTITIONER
Payer: COMMERCIAL

## 2019-06-05 DIAGNOSIS — M50.30 DDD (DEGENERATIVE DISC DISEASE), CERVICAL: ICD-10-CM

## 2019-06-05 PROCEDURE — 72141 MRI NECK SPINE W/O DYE: CPT

## 2019-06-06 DIAGNOSIS — N52.9 ED (ERECTILE DYSFUNCTION) OF ORGANIC ORIGIN: ICD-10-CM

## 2019-06-06 RX ORDER — SILDENAFIL CITRATE 20 MG/1
TABLET ORAL
Qty: 30 TAB | Refills: 2 | Status: SHIPPED | OUTPATIENT
Start: 2019-06-06 | End: 2019-06-19 | Stop reason: SDUPTHER

## 2019-06-06 NOTE — TELEPHONE ENCOUNTER
[id number] [address], [address] ([place] [address])    Patient called requesting a refill on his Revatio 20 MG Tablet.

## 2019-06-12 NOTE — PROGRESS NOTES
[address] [facility] [address]  Phone: [phone]  Fax: [phone]    Discharge Summary  2-15    Patient name: Ana Cristina Hayes  : 1965  Provider#: [id number]  Referral source: Janett Jimenez MD      Medical/Treatment Diagnosis: Neck pain on left side [M54.2]     Prior Hospitalization: see medical history     Comorbidities: See Plan of Care  Prior Level of Function:See Plan of Care  Medications: Verified on Patient Summary List    Start of Care: 19      Onset Date:2-3 weeks ago  Visits from Start of Care: 7     Missed Visits: 0  Reporting Period : 19 to 19      ASSESSMENT/SUMMARY OF CARE: Mr. Emma Castaneda was seen for 7 PT visits with the first 5 visits focusing on manual therapy and a gradual progression of therapeutic exercises. He returned tot he clinic in May and was seen for two additional PT visits with a focus on dry needling and DEREK respiration exercises. On his last visit he continued to report low-level (2/10) neck pain with prolonged typing, but had a significant improvement since his initial evaluation. He has not been seen since his last visit on 19 and will now be discharged.     Short Term Goals: To be accomplished in 2 weeks:  Pt will be independent w/ HEP Met. Pt will be able to sit w/ proper posture for 20 minutes Met. Pt will report 50% decrease in pain at rest Met. Long Term Goals: To be accomplished in 6 weeks:  Pt will report over 15 point improvement on FOTO Not met. Pt will demonstrate full cervical ROM w/o pain Not met. Pt will be able to play tennis w/o pain Not met. RECOMMENDATIONS:  [x]Discontinue therapy: [x]Patient has reached or is progressing toward set goals      []Patient is non-compliant or has abdicated      []Due to lack of appreciable progress towards set goals      []Other    Unity Outlaw, PT , DPT, OCS, Cert.  DN   2019

## 2019-06-19 ENCOUNTER — OFFICE VISIT (OUTPATIENT)
Dept: INTERNAL MEDICINE CLINIC | Age: 54
End: 2019-06-19

## 2019-06-19 VITALS
RESPIRATION RATE: 18 BRPM | BODY MASS INDEX: 26.86 KG/M2 | DIASTOLIC BLOOD PRESSURE: 76 MMHG | WEIGHT: 187.6 LBS | HEART RATE: 62 BPM | TEMPERATURE: 98.5 F | HEIGHT: 70 IN | OXYGEN SATURATION: 95 % | SYSTOLIC BLOOD PRESSURE: 115 MMHG

## 2019-06-19 DIAGNOSIS — R05.9 COUGH: ICD-10-CM

## 2019-06-19 DIAGNOSIS — M50.30 DDD (DEGENERATIVE DISC DISEASE), CERVICAL: ICD-10-CM

## 2019-06-19 DIAGNOSIS — J01.91 ACUTE RECURRENT SINUSITIS, UNSPECIFIED LOCATION: Primary | ICD-10-CM

## 2019-06-19 DIAGNOSIS — N52.9 ED (ERECTILE DYSFUNCTION) OF ORGANIC ORIGIN: ICD-10-CM

## 2019-06-19 RX ORDER — FLUTICASONE PROPIONATE 50 MCG
2 SPRAY, SUSPENSION (ML) NASAL DAILY
Qty: 1 BOTTLE | Refills: 5 | Status: SHIPPED | OUTPATIENT
Start: 2019-06-19 | End: 2019-09-24 | Stop reason: SDUPTHER

## 2019-06-19 RX ORDER — AMOXICILLIN AND CLAVULANATE POTASSIUM 875; 125 MG/1; MG/1
1 TABLET, FILM COATED ORAL 2 TIMES DAILY
Qty: 20 TAB | Refills: 0 | Status: SHIPPED | OUTPATIENT
Start: 2019-06-19 | End: 2020-09-18 | Stop reason: SDUPTHER

## 2019-06-19 RX ORDER — SILDENAFIL CITRATE 20 MG/1
TABLET ORAL
Qty: 90 TAB | Refills: 5 | Status: SHIPPED | OUTPATIENT
Start: 2019-06-19 | End: 2020-08-28 | Stop reason: ALTCHOICE

## 2019-06-19 NOTE — PROGRESS NOTES
Subjective: Former patient of Dr. Nancy Jiménez who is now transitioned to my care. Presents with nasal blockage, post nasal drip and bilateral sinus pain for 2 weeks. Denies shortness of breath, chest pain, abdominal pain, nausea, vomiting,  fever. Symptoms are moderate. Recent treatment for similar symptoms? Yes  Has been seen patient first and was given azithromycin on January 23 and cefdinir on February 23 and May 26. Feels that symptoms did mostly improve after taking antibiotics, but then started recurring. Has tried over-the-counter remedies with with partial relief of symptoms. Contacts with similar infections: no. Asthma?:  no.  reports that he has never smoked. He has never used smokeless tobacco.     He is seeing Dr. Mary Sampson for cervical spinal stenosis and may have an injection done soon. May very well need surgery again. MRI was recently is in chart. He was given gabapentin but has not started. Review of Systems  Pertinent items are noted in HPI. Objective:   Blood pressure 115/76, pulse 62, temperature 98.5 °F (36.9 °C), temperature source Oral, resp. rate 18, height 5' 10\" (1.778 m), weight 187 lb 9.6 oz (85.1 kg), SpO2 95 %. General:  alert, cooperative, no distress   Eyes: conjunctivae/corneas clear. Ears: normal TM's and external ear canals AU   Sinuses: paranasal sinuses with mild tenderness, Patient has indurated and edematous nasal tubinates   Mouth:  normal findings: palate normal, tongue midline and normal and oropharynx pink & moist with erythema, but no exudates, ulcers or evidence of thrush   Neck: supple, symmetrical, trachea midline and no adenopathy. Heart: S1 and S2 normal, no murmurs noted. Lungs: clear to auscultation bilaterally   Abdomen: soft, non-tender. Bowel sounds normal. No masses,  no organomegaly        Assessment/Plan:   Acute sinusitis   Somewhat recurrent. Partially treated with Cefdinir  May be an allergic component as well.   Will recommend another antibiotic and would recommend an oral steroid but she declines because it makes her feel to be irritable. Patient has failed conservative therapy  Orders Placed This Encounter    amoxicillin-clavulanate (AUGMENTIN) 875-125 mg per tablet    fluticasone propionate (FLONASE) 50 mcg/actuation nasal spray    sildenafil, antihypertensive, (REVATIO) 20 mg tablet       - Seek medical care if symptoms become more severe or if you develop chest pain, shortness of breath, confusion. Contact us if your symptoms fail to improve after 7-10 days Rest as much as possible and stay home from work/school at least 24 hours   after last fever. Wash hand frequently and cough/sneeze into your sleeve to help prevent infection of others. Drink plenty of fluids  - Ibuprofen (Advil, Motrin) 400-800mg every 6 hours or Aleve 220 mg 1-2 pills every 8 hours for fever, headache, pain  - Tylenol extra strength 500 mg every 6 hours for pain, headache, fever  - Nasal saline rinses 2-3 times daily for nasal congestion  - Benadryl (diphenhydramine) 50 mg at night for nasal congestion/allergies  - Pseudophedrine 12-hour tablets twice daily for nasal and inner ear congestion.    - Afrin (oxymetazoline) nasal spray 2 sprays in each nostril twice daily for severe      congestion. Do not use this medication for more than 3 days as it may cause         \"rebound congestion\".

## 2019-07-02 ENCOUNTER — TELEPHONE (OUTPATIENT)
Dept: INTERNAL MEDICINE CLINIC | Age: 54
End: 2019-07-02

## 2019-07-02 NOTE — TELEPHONE ENCOUNTER
----- Message from Broderick Coronel sent at 7/2/2019 11:14 AM EDT -----  Regarding: Dr. Rosa Maria Jaime  Medication Refill: antibiotic for sinus infection, PT can't remember the name, and is almost out    [phone]    Best contact: [phone]

## 2019-10-26 DIAGNOSIS — E78.00 PURE HYPERCHOLESTEROLEMIA: ICD-10-CM

## 2019-10-26 RX ORDER — SIMVASTATIN 20 MG/1
TABLET, FILM COATED ORAL
Qty: 90 TAB | Refills: 1 | Status: SHIPPED | OUTPATIENT
Start: 2019-10-26 | End: 2020-04-24

## 2019-11-26 DIAGNOSIS — L64.9 MALE PATTERN BALDNESS: ICD-10-CM

## 2019-11-27 RX ORDER — FINASTERIDE 5 MG/1
TABLET, FILM COATED ORAL
Qty: 45 TAB | Refills: 0 | Status: SHIPPED | OUTPATIENT
Start: 2019-11-27 | End: 2020-03-12

## 2020-01-22 ENCOUNTER — APPOINTMENT (OUTPATIENT)
Dept: CT IMAGING | Age: 55
End: 2020-01-22
Attending: EMERGENCY MEDICINE
Payer: COMMERCIAL

## 2020-01-22 ENCOUNTER — HOSPITAL ENCOUNTER (EMERGENCY)
Age: 55
Discharge: HOME | End: 2020-01-22
Attending: EMERGENCY MEDICINE
Payer: COMMERCIAL

## 2020-01-22 VITALS
BODY MASS INDEX: 26.92 KG/M2 | OXYGEN SATURATION: 95 % | HEART RATE: 64 BPM | WEIGHT: 188 LBS | TEMPERATURE: 98.7 F | RESPIRATION RATE: 16 BRPM | SYSTOLIC BLOOD PRESSURE: 122 MMHG | DIASTOLIC BLOOD PRESSURE: 74 MMHG | HEIGHT: 70 IN

## 2020-01-22 DIAGNOSIS — N20.0 KIDNEY STONE: Primary | ICD-10-CM

## 2020-01-22 LAB
ALBUMIN SERPL-MCNC: 4 G/DL (ref 3.5–5)
ALBUMIN/GLOB SERPL: 1.1 {RATIO} (ref 1.1–2.2)
ALP SERPL-CCNC: 56 U/L (ref 45–117)
ALT SERPL-CCNC: 31 U/L (ref 12–78)
ANION GAP SERPL CALC-SCNC: 6 MMOL/L (ref 5–15)
APPEARANCE UR: CLEAR
AST SERPL-CCNC: 22 U/L (ref 15–37)
BACTERIA URNS QL MICRO: NEGATIVE /HPF
BASOPHILS # BLD: 0.1 K/UL (ref 0–0.1)
BASOPHILS NFR BLD: 1 % (ref 0–1)
BILIRUB SERPL-MCNC: 0.4 MG/DL (ref 0.2–1)
BILIRUB UR QL: NEGATIVE
BUN SERPL-MCNC: 25 MG/DL (ref 6–20)
BUN/CREAT SERPL: 22 (ref 12–20)
CALCIUM SERPL-MCNC: 9.5 MG/DL (ref 8.5–10.1)
CHLORIDE SERPL-SCNC: 106 MMOL/L (ref 97–108)
CO2 SERPL-SCNC: 25 MMOL/L (ref 21–32)
COLOR UR: ABNORMAL
CREAT SERPL-MCNC: 1.12 MG/DL (ref 0.7–1.3)
DIFFERENTIAL METHOD BLD: NORMAL
EOSINOPHIL # BLD: 0.2 K/UL (ref 0–0.4)
EOSINOPHIL NFR BLD: 2 % (ref 0–7)
EPITH CASTS URNS QL MICRO: ABNORMAL /LPF
ERYTHROCYTE [DISTWIDTH] IN BLOOD BY AUTOMATED COUNT: 12.4 % (ref 11.5–14.5)
GLOBULIN SER CALC-MCNC: 3.7 G/DL (ref 2–4)
GLUCOSE SERPL-MCNC: 112 MG/DL (ref 65–100)
GLUCOSE UR STRIP.AUTO-MCNC: NEGATIVE MG/DL
HCT VFR BLD AUTO: 41.6 % (ref 36.6–50.3)
HGB BLD-MCNC: 14 G/DL (ref 12.1–17)
HGB UR QL STRIP: ABNORMAL
HYALINE CASTS URNS QL MICRO: ABNORMAL /LPF (ref 0–5)
IMM GRANULOCYTES # BLD AUTO: 0 K/UL (ref 0–0.04)
IMM GRANULOCYTES NFR BLD AUTO: 0 % (ref 0–0.5)
KETONES UR QL STRIP.AUTO: NEGATIVE MG/DL
LEUKOCYTE ESTERASE UR QL STRIP.AUTO: NEGATIVE
LYMPHOCYTES # BLD: 2.3 K/UL (ref 0.8–3.5)
LYMPHOCYTES NFR BLD: 21 % (ref 12–49)
MCH RBC QN AUTO: 28.3 PG (ref 26–34)
MCHC RBC AUTO-ENTMCNC: 33.7 G/DL (ref 30–36.5)
MCV RBC AUTO: 84 FL (ref 80–99)
MONOCYTES # BLD: 0.9 K/UL (ref 0–1)
MONOCYTES NFR BLD: 8 % (ref 5–13)
NEUTS SEG # BLD: 7.6 K/UL (ref 1.8–8)
NEUTS SEG NFR BLD: 68 % (ref 32–75)
NITRITE UR QL STRIP.AUTO: NEGATIVE
NRBC # BLD: 0 K/UL (ref 0–0.01)
NRBC BLD-RTO: 0 PER 100 WBC
PH UR STRIP: 5.5 [PH] (ref 5–8)
PLATELET # BLD AUTO: 242 K/UL (ref 150–400)
PMV BLD AUTO: 9.7 FL (ref 8.9–12.9)
POTASSIUM SERPL-SCNC: 3.8 MMOL/L (ref 3.5–5.1)
PROT SERPL-MCNC: 7.7 G/DL (ref 6.4–8.2)
PROT UR STRIP-MCNC: NEGATIVE MG/DL
RBC # BLD AUTO: 4.95 M/UL (ref 4.1–5.7)
RBC #/AREA URNS HPF: ABNORMAL /HPF (ref 0–5)
SODIUM SERPL-SCNC: 137 MMOL/L (ref 136–145)
SP GR UR REFRACTOMETRY: 1.02 (ref 1–1.03)
UA: UC IF INDICATED,UAUC: ABNORMAL
UROBILINOGEN UR QL STRIP.AUTO: 0.2 EU/DL (ref 0.2–1)
WBC # BLD AUTO: 11 K/UL (ref 4.1–11.1)
WBC URNS QL MICRO: ABNORMAL /HPF (ref 0–4)

## 2020-01-22 PROCEDURE — 74011250636 HC RX REV CODE- 250/636: Performed by: EMERGENCY MEDICINE

## 2020-01-22 PROCEDURE — 96374 THER/PROPH/DIAG INJ IV PUSH: CPT

## 2020-01-22 PROCEDURE — 74176 CT ABD & PELVIS W/O CONTRAST: CPT

## 2020-01-22 PROCEDURE — 36415 COLL VENOUS BLD VENIPUNCTURE: CPT

## 2020-01-22 PROCEDURE — 99283 EMERGENCY DEPT VISIT LOW MDM: CPT

## 2020-01-22 PROCEDURE — 80053 COMPREHEN METABOLIC PANEL: CPT

## 2020-01-22 PROCEDURE — 85025 COMPLETE CBC W/AUTO DIFF WBC: CPT

## 2020-01-22 PROCEDURE — 96375 TX/PRO/DX INJ NEW DRUG ADDON: CPT

## 2020-01-22 PROCEDURE — 81001 URINALYSIS AUTO W/SCOPE: CPT

## 2020-01-22 RX ORDER — HYDROCODONE BITARTRATE AND ACETAMINOPHEN 5; 325 MG/1; MG/1
1 TABLET ORAL
Qty: 6 TAB | Refills: 0 | Status: SHIPPED | OUTPATIENT
Start: 2020-01-22 | End: 2020-01-25

## 2020-01-22 RX ORDER — ONDANSETRON 4 MG/1
4 TABLET, ORALLY DISINTEGRATING ORAL
Qty: 10 TAB | Refills: 0 | Status: SHIPPED | OUTPATIENT
Start: 2020-01-22 | End: 2020-08-28 | Stop reason: ALTCHOICE

## 2020-01-22 RX ORDER — KETOROLAC TROMETHAMINE 30 MG/ML
30 INJECTION, SOLUTION INTRAMUSCULAR; INTRAVENOUS
Status: COMPLETED | OUTPATIENT
Start: 2020-01-22 | End: 2020-01-22

## 2020-01-22 RX ORDER — TAMSULOSIN HYDROCHLORIDE 0.4 MG/1
0.4 CAPSULE ORAL DAILY
Qty: 15 CAP | Refills: 0 | Status: SHIPPED | OUTPATIENT
Start: 2020-01-22 | End: 2020-02-06

## 2020-01-22 RX ORDER — NAPROXEN 500 MG/1
500 TABLET ORAL 2 TIMES DAILY WITH MEALS
Qty: 20 TAB | Refills: 0 | Status: SHIPPED | OUTPATIENT
Start: 2020-01-22 | End: 2020-02-01

## 2020-01-22 RX ORDER — BUPROPION HYDROCHLORIDE 150 MG/1
150 TABLET ORAL
COMMUNITY
End: 2020-08-28 | Stop reason: ALTCHOICE

## 2020-01-22 RX ORDER — ONDANSETRON 2 MG/ML
4 INJECTION INTRAMUSCULAR; INTRAVENOUS
Status: COMPLETED | OUTPATIENT
Start: 2020-01-22 | End: 2020-01-22

## 2020-01-22 RX ADMIN — KETOROLAC TROMETHAMINE 30 MG: 30 INJECTION INTRAMUSCULAR; INTRAVENOUS at 21:28

## 2020-01-22 RX ADMIN — ONDANSETRON 4 MG: 2 INJECTION INTRAMUSCULAR; INTRAVENOUS at 21:26

## 2020-01-22 RX ADMIN — SODIUM CHLORIDE 1000 ML: 900 INJECTION, SOLUTION INTRAVENOUS at 21:25

## 2020-01-23 NOTE — ED TRIAGE NOTES
Patient reported right flank pain that radiates to the front of the abdomen and reported right sided testicular pain.   + Nausea and vomiting. Hx of kidney stones.

## 2020-01-23 NOTE — ED NOTES
Assumed care of patient. He comes to the ED from home with c/o right flank pain. He has received pain medication and states he has some relief. IVF infusing without issue. Call bell within reach. Pt verbalizes understanding of plan of care pending test results.

## 2020-01-23 NOTE — ED PROVIDER NOTES
50 y.o. male with past medical history significant for gout, hyperlipidemia, dysthymia, angioedema, cervical spine fracture, nephrolith, seizures, and DDD who presents from home via personal vehicle with chief complaint of back pain. Pt reports he experienced the onset of back pain around 10 AM today. Pt states the back pain radiated into his lower back and testicles. He says his pain is intermittent and whenever the pain comes back he feels nauseated. Pt notes he took some pepto-bismol because it felt like he had a stomach ache but says it served no relief. Pt denies any redness or swelling to his testicles. Additionally, the pt shared he has a history of kidney stones and says he was admitted at [facility] years ago, where they flushed the stone back into his kidney, so he is unsure if this could be a kidney stone. Pt specifically affirms: abdominal pain, nausea, diarrhea, testicular pain, and back pain. Pt specifically denies: fever, dysuria, and blood in stool. There are no other acute medical concerns at this time. Social hx: Occasional alcohol use. PCP: Harshad Jimenez MD      Note written by Alen Washburn, as dictated by Linda Maxwell DO 8:32 PM             The history is provided by the patient. No  was used. Past Medical History:   Diagnosis Date    Cervical spine fracture ([address] 09/05/2014    DDD (degenerative disc disease), cervical     Dr Zulay Zuleta. MRI 6/2019 severe left foraminal stenosis with disc protrusion/osteophyte C4    Dysthymia 05/23/2018    taking celexa since divorce    Elective hair transplant for purposes other than remedying health states     Gout     History of angioedema 05/23/2018    saw allergist- testing neg for nut allergy    History of seizures as a child     childhood.  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, takes finasteride    Nephrolith     x 2 Past Surgical History:   Procedure Laterality Date    HX CERVICAL FUSION  20014    c6-c7 fusion following fracture    HX COLONOSCOPY  02/15/2019    normal    HX REFRACTIVE SURGERY      HX UROLOGICAL      KIDNEY STONE REMOVAL         Family History:   Problem Relation Age of Onset    Cancer Mother         BREAST    Anxiety Mother     Cancer Father         PROSTATE    Anxiety Father     Anxiety Sister     Anxiety Brother     Other Brother         amyloidosis    Anesth Problems Neg Hx     Diabetes Neg Hx     Heart Disease Neg Hx     Hypertension Neg Hx        Social History     Socioeconomic History    Marital status:      Spouse name: Not on file    Number of children: 1    Years of education: Not on file    Highest education level: Not on file   Occupational History    Occupation: [employer] since 2012     Employer: [employer]   Social Needs    Financial resource strain: Not on file    Food insecurity:     Worry: Not on file     Inability: Not on file    Transportation needs:     Medical: Not on file     Non-medical: Not on file   Tobacco Use    Smoking status: Never Smoker    Smokeless tobacco: Never Used   Substance and Sexual Activity    Alcohol use:  Yes     Alcohol/week: 0.0 - 1.0 standard drinks    Drug use: No    Sexual activity: Yes     Partners: Female   Lifestyle    Physical activity:     Days per week: Not on file     Minutes per session: Not on file    Stress: Not on file   Relationships    Social connections:     Talks on phone: Not on file     Gets together: Not on file     Attends Latter day service: Not on file     Active member of club or organization: Not on file     Attends meetings of clubs or organizations: Not on file     Relationship status: Not on file    Intimate partner violence:     Fear of current or ex partner: Not on file     Emotionally abused: Not on file     Physically abused: Not on file     Forced sexual activity: Not on file   Other Topics Concern    Not on file   Social History Narrative    ** Merged History Encounter **              ALLERGIES: Patient has no known allergies. Review of Systems   Constitutional: Negative for activity change, appetite change, chills and fever. HENT: Negative for congestion, rhinorrhea, sinus pain, sneezing and sore throat. Eyes: Negative for photophobia and visual disturbance. Respiratory: Negative for cough and shortness of breath. Cardiovascular: Negative for chest pain. Gastrointestinal: Positive for abdominal pain, diarrhea and nausea. Negative for blood in stool, constipation and vomiting. Genitourinary: Positive for testicular pain. Negative for difficulty urinating, dysuria, flank pain, hematuria and penile pain. Musculoskeletal: Positive for back pain. Negative for arthralgias, myalgias and neck pain. Skin: Negative for rash and wound. Neurological: Negative for syncope, weakness, light-headedness, numbness and headaches. Psychiatric/Behavioral: Negative for self-injury and suicidal ideas. All other systems reviewed and are negative. Vitals:    01/22/20 1952   BP: 137/82   Pulse: 64   Resp: 16   Temp: 98.7 °F (37.1 °C)   SpO2: 99%   Weight: 85.3 kg (188 lb)   Height: 5' 10\" (1.778 m)            Physical Exam  Vitals signs and nursing note reviewed. Constitutional:       General: He is not in acute distress. Appearance: Normal appearance. He is well-developed. He is not diaphoretic. HENT:      Head: Normocephalic and atraumatic. Nose: Nose normal.   Eyes:      Extraocular Movements: Extraocular movements intact. Conjunctiva/sclera: Conjunctivae normal.      Pupils: Pupils are equal, round, and reactive to light. Neck:      Musculoskeletal: Neck supple. Cardiovascular:      Rate and Rhythm: Normal rate and regular rhythm. Heart sounds: Normal heart sounds.    Pulmonary:      Effort: Pulmonary effort is normal.      Breath sounds: Normal breath sounds. Abdominal:      General: There is no distension. Palpations: Abdomen is soft. Tenderness: There is tenderness. There is right CVA tenderness (mild). Hernia: There is no hernia in the right inguinal area or left inguinal area. Genitourinary:     Penis: No swelling or lesions. Comments: No redness in testicles. Musculoskeletal:         General: No tenderness. Skin:     General: Skin is warm and dry. Neurological:      General: No focal deficit present. Mental Status: He is alert and oriented to person, place, and time. Cranial Nerves: No cranial nerve deficit. Sensory: No sensory deficit. Motor: No weakness. Coordination: Coordination normal.     Note written by Alen Palma, as dictated by Godwin Awad DO 8:32 PM       MDM   26-year-old male with a history of kidney stones presents with right flank pain. High suspicion for kidney stone. Patient is afebrile with vital signs stable. Given dose of toradol and had resolution of his sx.     UA shows small blood but no evidence of infection. Labs otherwise unremarkable. CT abdomen pelvis was ordered to further evaluate and showed mild right hydro with a 0.6cm stone at the UPJ. Rx'd naprosyn, norco, zofran, and flomax and recommended close follow up with [facility] for further evaluation. Return precaution were given for worsening or concerns.      Procedures

## 2020-01-23 NOTE — ED NOTES
Patient (s)  given copy of dc instructions and  script(s). Patient (s)  verbalized understanding of instructions and script (s). Patient given a current medication reconciliation form and verbalized understanding of their medications. Patient (s) verbalized understanding of the importance of discussing medications with  his or her physician or clinic they will be following up with. Patient alert and oriented and in no acute distress. Patient discharged home ambulatory with belongings.

## 2020-01-25 DIAGNOSIS — M10.9 GOUT, UNSPECIFIED CAUSE, UNSPECIFIED CHRONICITY, UNSPECIFIED SITE: Chronic | ICD-10-CM

## 2020-01-25 RX ORDER — ALLOPURINOL 300 MG/1
TABLET ORAL
Qty: 90 TAB | Refills: 1 | Status: SHIPPED | OUTPATIENT
Start: 2020-01-25 | End: 2020-07-30

## 2020-02-14 ENCOUNTER — TELEPHONE (OUTPATIENT)
Dept: INTERNAL MEDICINE CLINIC | Age: 55
End: 2020-02-14

## 2020-02-14 RX ORDER — OSELTAMIVIR PHOSPHATE 75 MG/1
75 CAPSULE ORAL DAILY
Qty: 10 CAP | Refills: 0 | Status: SHIPPED | OUTPATIENT
Start: 2020-02-14 | End: 2020-02-24

## 2020-02-14 NOTE — TELEPHONE ENCOUNTER
Prescription sent to pharmacy, per request  Preventative is once daily if he wants  Does not have to start unless symtomatc (then take).

## 2020-02-14 NOTE — TELEPHONE ENCOUNTER
Patient's wife has the flu ( diagnosed today )  and is requesting Tamiflu to be called in for him.  Patient pharmacy [id number] Patient request a return confirmation call when this has been done - thank you

## 2020-03-12 DIAGNOSIS — L64.9 MALE PATTERN BALDNESS: ICD-10-CM

## 2020-03-12 RX ORDER — FINASTERIDE 5 MG/1
TABLET, FILM COATED ORAL
Qty: 45 TAB | Refills: 0 | Status: SHIPPED | OUTPATIENT
Start: 2020-03-12 | End: 2020-07-27

## 2020-03-17 ENCOUNTER — TELEPHONE (OUTPATIENT)
Dept: INTERNAL MEDICINE CLINIC | Age: 55
End: 2020-03-17

## 2020-03-17 NOTE — TELEPHONE ENCOUNTER
Records reviewed. His hemoglobin March 13 was 12.7 and this lab shows 13.0 and above are normal.    He has had some blood in his urine from his recent kidney stones and urinalysis on March 13 showed a couple of red blood cells. Please reassure that this is very very mild anemia and likely will improve as the bleeding from the stones improves. Increase iron rich foods in his diet. Can take an iron supplement if he prefers, but I do not think it is absolutely necessary. Repeat blood cells in 2 to 3 months.

## 2020-03-17 NOTE — TELEPHONE ENCOUNTER
I spoke with patient regarding his appt tomorrow to discuss his kidney stone. Pt states he was told to f/up with PCP regarding his low Hgb/HCT levels. He wants to know if he needs to start iron or what should he do regarding these results? He would like to discuss this over the phone and cancel appt tomorrow.

## 2020-04-24 DIAGNOSIS — E78.00 PURE HYPERCHOLESTEROLEMIA: ICD-10-CM

## 2020-04-24 RX ORDER — SIMVASTATIN 20 MG/1
TABLET, FILM COATED ORAL
Qty: 90 TAB | Refills: 1 | Status: SHIPPED | OUTPATIENT
Start: 2020-04-24 | End: 2020-08-03 | Stop reason: SDUPTHER

## 2020-07-27 DIAGNOSIS — L64.9 MALE PATTERN BALDNESS: ICD-10-CM

## 2020-07-27 RX ORDER — FINASTERIDE 5 MG/1
TABLET, FILM COATED ORAL
Qty: 45 TAB | Refills: 0 | Status: SHIPPED | OUTPATIENT
Start: 2020-07-27 | End: 2020-09-07 | Stop reason: SDUPTHER

## 2020-07-30 DIAGNOSIS — M10.9 GOUT, UNSPECIFIED CAUSE, UNSPECIFIED CHRONICITY, UNSPECIFIED SITE: Chronic | ICD-10-CM

## 2020-07-30 RX ORDER — ALLOPURINOL 300 MG/1
TABLET ORAL
Qty: 90 TAB | Refills: 1 | Status: SHIPPED | OUTPATIENT
Start: 2020-07-30 | End: 2021-06-07

## 2020-07-31 DIAGNOSIS — E78.00 PURE HYPERCHOLESTEROLEMIA: ICD-10-CM

## 2020-07-31 RX ORDER — SIMVASTATIN 20 MG/1
TABLET, FILM COATED ORAL
Qty: 90 TAB | Refills: 1 | OUTPATIENT
Start: 2020-07-31

## 2020-08-03 RX ORDER — SIMVASTATIN 20 MG/1
TABLET, FILM COATED ORAL
Qty: 30 TAB | Refills: 1 | Status: SHIPPED | OUTPATIENT
Start: 2020-08-03 | End: 2021-03-14

## 2020-08-03 NOTE — TELEPHONE ENCOUNTER
Reached out to patient to assist w/scheduling a medication management / follow up appointment per PCP.  Patient has been scheduled for 08/12 @ 3:40 Vv - thank you

## 2020-08-28 ENCOUNTER — VIRTUAL VISIT (OUTPATIENT)
Dept: INTERNAL MEDICINE CLINIC | Age: 55
End: 2020-08-28
Payer: COMMERCIAL

## 2020-08-28 DIAGNOSIS — E78.00 PURE HYPERCHOLESTEROLEMIA: Primary | ICD-10-CM

## 2020-08-28 DIAGNOSIS — M10.9 GOUT, UNSPECIFIED CAUSE, UNSPECIFIED CHRONICITY, UNSPECIFIED SITE: ICD-10-CM

## 2020-08-28 DIAGNOSIS — N52.9 ERECTILE DYSFUNCTION, UNSPECIFIED ERECTILE DYSFUNCTION TYPE: ICD-10-CM

## 2020-08-28 DIAGNOSIS — Z12.5 SCREENING PSA (PROSTATE SPECIFIC ANTIGEN): ICD-10-CM

## 2020-08-28 PROCEDURE — 99213 OFFICE O/P EST LOW 20 MIN: CPT | Performed by: INTERNAL MEDICINE

## 2020-08-28 RX ORDER — TADALAFIL 10 MG/1
10 TABLET ORAL
Qty: 30 TAB | Refills: 2 | Status: SHIPPED | OUTPATIENT
Start: 2020-08-28 | End: 2021-11-19 | Stop reason: ALTCHOICE

## 2020-08-28 NOTE — PROGRESS NOTES
Consent: Kira Lakhani, who was seen by synchronous (real-time) audio-video technology, and/or his healthcare decision maker, is aware that this patient-initiated, Telehealth encounter on 8/28/2020 is a billable service, with coverage as determined by his insurance carrier. He is aware that he may receive a bill and has provided verbal consent to proceed: Yes. 712  Subjective:   Kira Lakhani is a 50 y.o. male who was seen for Gout      Doing well. Engaged to Ankit Pacheco who has 3 kids. Son at [facility]. Lost 16 lb in 1 mo due to diet. Hx kidney stones and surgery 3/2020. No recurrence. Doing well. Off anti-depressants. ED. Sildenafil 60 mg helps, but headaches are severe. Hyperlipidemia  Currently he takes simvastatin 20 mg  ROS: taking medications as instructed, no medication side effects noted  No new myalgias, no joint pains, no weakness  No TIA's, no chest pain on exertion, no dyspnea on exertion, no swelling of ankles. Lab Results   Component Value Date/Time    Cholesterol, total 184 06/16/2018 08:39 AM    HDL Cholesterol 40 06/16/2018 08:39 AM    LDL, calculated 120 (H) 06/16/2018 08:39 AM    VLDL, calculated 24 06/16/2018 08:39 AM    Triglyceride 121 06/16/2018 08:39 AM     Gout follow-up  Past gout history: acute gouty arthritis. Current gout treatment: allopurinol (Zyloprim). Side effects of current therapy: none. Progress since last visit:  no acute gout attacks since last clinic visit. .  The patient is attempting to avoid high purine foods and alcohol. Lab Results   Component Value Date/Time    Uric acid 5.9 06/16/2018 08:39 AM       Current Outpatient Medications   Medication Sig    tadalafiL (CIALIS) 10 mg tablet Take 1 Tab by mouth daily as needed for Erectile Dysfunction.     simvastatin (ZOCOR) 20 mg tablet TAKE 1 TABLET BY MOUTH EVERY DAY AT NIGHT    allopurinoL (ZYLOPRIM) 300 mg tablet TAKE 1 TABLET BY MOUTH EVERY DAY    finasteride (PROSCAR) 5 mg tablet TAKE 1/2 TABLET BY MOUTH EVERY DAY    fluticasone propionate (FLONASE) 50 mcg/actuation nasal spray SPRAY 2 SPRAYS INTO EACH NOSTRIL EVERY DAY    EPINEPHrine (EPIPEN) 0.3 mg/0.3 mL injection 0.3 mL by IntraMUSCular route once as needed for up to 1 dose.  tretinoin 0.025 % tpical gel APPLY A PEA-SIZED AMOUNT TO FACE EXTERNALLY AT NIGHT    omega-3 fatty acids-vitamin e (FISH OIL) 1,000 mg cap Take 4 Caps by mouth daily.  biotin 1 mg tab Take 1 Tab by mouth daily.  MULTIVITAMIN PO Take  by mouth daily. No current facility-administered medications for this visit. No Known Allergies    Past Medical History:   Diagnosis Date    Cervical spine fracture ([facility] [address] 09/05/2014    DDD (degenerative disc disease), cervical     Dr Elvin Khan. MRI 6/2019 severe left foraminal stenosis with disc protrusion/osteophyte C4    Dysthymia 05/23/2018    took celexa since divorce    Elective hair transplant for purposes other than remedying health states     Gout     History of angioedema 05/23/2018    saw allergist- testing neg for nut allergy    History of seizures as a child     childhood. 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 surgery 3/2020       ROS  All other systems reviewed and negative, unless mentioned in HPI    Objective:   Vital Signs: (As obtained by patient/caregiver at home)  There were no vitals taken for this visit.      [INSTRUCTIONS:  \"[x]\" Indicates a positive item  \"[]\" Indicates a negative item  -- DELETE ALL ITEMS NOT EXAMINED]    Constitutional: [x] Appears well-developed and well-nourished [x] No apparent distress      [] Abnormal -     Mental status: [x] Alert and awake  [x] Oriented to person/place/time [x] Able to follow commands    [] Abnormal -     Eyes:   EOM    [x]  Normal    [] Abnormal -   Sclera  [x]  Normal    [] Abnormal -          Discharge [x]  None visible   [] Abnormal - HENT: [x] Normocephalic, atraumatic  [] Abnormal -   [x] Mouth/Throat: Mucous membranes are moist    External Ears [x] Normal  [] Abnormal -    Neck: [x] No visualized mass [] Abnormal -     Pulmonary/Chest: [x] Respiratory effort normal   [x] No visualized signs of difficulty breathing or respiratory distress        [] Abnormal -      Musculoskeletal:   [x] Normal gait with no signs of ataxia         [x] Normal range of motion of neck        [] Abnormal -     Neurological:        [x] No Facial Asymmetry (Cranial nerve 7 motor function) (limited exam due to video visit)          [x] No gaze palsy        [] Abnormal -          Skin:        [x] No significant exanthematous lesions or discoloration noted on facial skin         [] Abnormal -            Psychiatric:       [x] Normal Affect [] Abnormal -        [x] No Hallucinations    Other pertinent observable physical exam findings:-        Assessment & Plan:   Diagnoses and all orders for this visit:    1. Pure hypercholesterolemia  Controlled on current regimen. Continue current medications as written in chart.  -     METABOLIC PANEL, COMPREHENSIVE; Future  -     LIPID PANEL; Future    2. Gout, unspecified cause, unspecified chronicity, unspecified site  Currently asymptomatic  -     URIC ACID; Future    3. Screening PSA (prostate specific antigen)  -     PSA W/ REFLX FREE PSA; Future    4. Erectile dysfunction, unspecified erectile dysfunction type  Cannot tolerate sildenafil because of headaches it was taking 60 mg. We will try generic Cialis and use Good Rx coupon. -     tadalafiL (CIALIS) 10 mg tablet; Take 1 Tab by mouth daily as needed for Erectile Dysfunction. We discussed the expected course, resolution and complications of the diagnosis(es) in detail. Medication risks, benefits, costs, interactions, and alternatives were discussed as indicated.   I advised him to contact the office if his condition worsens, changes or fails to improve as anticipated. He expressed understanding with the diagnosis(es) and plan. Burleigh Schaumann is a 50 y.o. male who was evaluated by an audio-video encounter for concerns as above. Patient identification was verified prior to start of the visit. A caregiver was present when appropriate. Due to this being a TeleHealth encounter (During TKUGX-32 public health emergency), evaluation of the following organ systems was limited: Vitals/Constitutional/EENT/Resp/CV/GI//MS/Neuro/Skin/Heme-Lymph-Imm. Pursuant to the emergency declaration under the [facility], [facility] waiver authority and the [employer] Act, this Virtual Visit was conducted, with patient's (and/or legal guardian's) consent, to reduce the patient's risk of exposure to COVID-19 and provide necessary medical care. Services were provided through a synchronous discussion virtually to substitute for in-person clinic visit. I was in the office. The patient was at home.      Dayday Alvares MD

## 2020-09-18 ENCOUNTER — E-VISIT (OUTPATIENT)
Dept: INTERNAL MEDICINE CLINIC | Age: 55
End: 2020-09-18

## 2020-09-18 DIAGNOSIS — J01.91 ACUTE RECURRENT SINUSITIS, UNSPECIFIED LOCATION: ICD-10-CM

## 2020-09-18 DIAGNOSIS — R05.9 COUGH: ICD-10-CM

## 2020-09-18 RX ORDER — AMOXICILLIN AND CLAVULANATE POTASSIUM 875; 125 MG/1; MG/1
1 TABLET, FILM COATED ORAL 2 TIMES DAILY
Qty: 20 TAB | Refills: 0 | Status: SHIPPED | OUTPATIENT
Start: 2020-09-18 | End: 2020-09-28

## 2020-09-18 NOTE — TELEPHONE ENCOUNTER
Sinus infection. Responded to abx in the past.  Augmentin sent. Return to clinic for further evaluation if new symptoms develop or if current symptoms worsen or fail to resolve.

## 2020-10-01 ENCOUNTER — E-VISIT (OUTPATIENT)
Dept: INTERNAL MEDICINE CLINIC | Age: 55
End: 2020-10-01

## 2020-10-01 DIAGNOSIS — H00.019 HORDEOLUM EXTERNUM, UNSPECIFIED LATERALITY: Primary | ICD-10-CM

## 2020-10-01 RX ORDER — ERYTHROMYCIN 5 MG/G
OINTMENT OPHTHALMIC
Qty: 1 TUBE | Refills: 0 | Status: SHIPPED | OUTPATIENT
Start: 2020-10-01 | End: 2020-10-08

## 2021-03-05 ENCOUNTER — IMMUNIZATION (OUTPATIENT)
Dept: INTERNAL MEDICINE CLINIC | Age: 56
End: 2021-03-05
Payer: COMMERCIAL

## 2021-03-05 DIAGNOSIS — Z23 ENCOUNTER FOR IMMUNIZATION: Primary | ICD-10-CM

## 2021-03-05 PROCEDURE — 91300 COVID-19, MRNA, LNP-S, PF, 30MCG/0.3ML DOSE(PFIZER): CPT | Performed by: FAMILY MEDICINE

## 2021-03-05 PROCEDURE — 0001A COVID-19, MRNA, LNP-S, PF, 30MCG/0.3ML DOSE(PFIZER): CPT | Performed by: FAMILY MEDICINE

## 2021-03-14 DIAGNOSIS — E78.00 PURE HYPERCHOLESTEROLEMIA: ICD-10-CM

## 2021-03-14 RX ORDER — SIMVASTATIN 20 MG/1
TABLET, FILM COATED ORAL
Qty: 90 TAB | Refills: 1 | Status: SHIPPED | OUTPATIENT
Start: 2021-03-14 | End: 2021-11-05

## 2021-03-26 ENCOUNTER — IMMUNIZATION (OUTPATIENT)
Dept: INTERNAL MEDICINE CLINIC | Age: 56
End: 2021-03-26
Payer: COMMERCIAL

## 2021-03-26 DIAGNOSIS — Z23 ENCOUNTER FOR IMMUNIZATION: Primary | ICD-10-CM

## 2021-03-26 PROCEDURE — 91300 COVID-19, MRNA, LNP-S, PF, 30MCG/0.3ML DOSE(PFIZER): CPT | Performed by: FAMILY MEDICINE

## 2021-03-26 PROCEDURE — 0002A COVID-19, MRNA, LNP-S, PF, 30MCG/0.3ML DOSE(PFIZER): CPT | Performed by: FAMILY MEDICINE

## 2021-04-05 DIAGNOSIS — L64.9 MALE PATTERN BALDNESS: ICD-10-CM

## 2021-04-05 RX ORDER — FINASTERIDE 5 MG/1
TABLET, FILM COATED ORAL
Qty: 90 TAB | Refills: 2 | Status: SHIPPED | OUTPATIENT
Start: 2021-04-05 | End: 2022-08-10 | Stop reason: SDUPTHER

## 2021-06-07 DIAGNOSIS — M10.9 GOUT, UNSPECIFIED CAUSE, UNSPECIFIED CHRONICITY, UNSPECIFIED SITE: Chronic | ICD-10-CM

## 2021-06-07 RX ORDER — ALLOPURINOL 300 MG/1
TABLET ORAL
Qty: 90 TABLET | Refills: 1 | Status: SHIPPED | OUTPATIENT
Start: 2021-06-07 | End: 2021-10-08

## 2021-10-08 DIAGNOSIS — M10.9 GOUT, UNSPECIFIED CAUSE, UNSPECIFIED CHRONICITY, UNSPECIFIED SITE: Chronic | ICD-10-CM

## 2021-10-08 RX ORDER — ALLOPURINOL 300 MG/1
TABLET ORAL
Qty: 90 TABLET | Refills: 1 | Status: SHIPPED | OUTPATIENT
Start: 2021-10-08 | End: 2022-06-07

## 2021-10-15 NOTE — TELEPHONE ENCOUNTER
Reach out to patient to assist with scheduling a medication management / follow up appointment per PCP.  Patient has been scheduled for 11/19 @ 3:20 - thank you

## 2021-11-05 DIAGNOSIS — E78.00 PURE HYPERCHOLESTEROLEMIA: ICD-10-CM

## 2021-11-05 RX ORDER — SIMVASTATIN 20 MG/1
TABLET, FILM COATED ORAL
Qty: 90 TABLET | Refills: 1 | Status: SHIPPED | OUTPATIENT
Start: 2021-11-05 | End: 2022-03-15 | Stop reason: ALTCHOICE

## 2021-11-19 ENCOUNTER — OFFICE VISIT (OUTPATIENT)
Dept: INTERNAL MEDICINE CLINIC | Age: 56
End: 2021-11-19
Payer: COMMERCIAL

## 2021-11-19 VITALS
TEMPERATURE: 97.7 F | HEART RATE: 60 BPM | HEIGHT: 70 IN | DIASTOLIC BLOOD PRESSURE: 71 MMHG | WEIGHT: 175.4 LBS | BODY MASS INDEX: 25.11 KG/M2 | RESPIRATION RATE: 15 BRPM | SYSTOLIC BLOOD PRESSURE: 114 MMHG | OXYGEN SATURATION: 99 %

## 2021-11-19 DIAGNOSIS — Z11.59 ENCOUNTER FOR HEPATITIS C SCREENING TEST FOR LOW RISK PATIENT: ICD-10-CM

## 2021-11-19 DIAGNOSIS — F34.1 DYSTHYMIA: ICD-10-CM

## 2021-11-19 DIAGNOSIS — M10.9 GOUT, UNSPECIFIED CAUSE, UNSPECIFIED CHRONICITY, UNSPECIFIED SITE: ICD-10-CM

## 2021-11-19 DIAGNOSIS — E78.00 PURE HYPERCHOLESTEROLEMIA: ICD-10-CM

## 2021-11-19 DIAGNOSIS — Z23 NEEDS FLU SHOT: ICD-10-CM

## 2021-11-19 DIAGNOSIS — M50.30 DDD (DEGENERATIVE DISC DISEASE), CERVICAL: Primary | ICD-10-CM

## 2021-11-19 DIAGNOSIS — Z23 ENCOUNTER FOR IMMUNIZATION: ICD-10-CM

## 2021-11-19 DIAGNOSIS — Z12.5 SCREENING PSA (PROSTATE SPECIFIC ANTIGEN): ICD-10-CM

## 2021-11-19 PROCEDURE — 90471 IMMUNIZATION ADMIN: CPT | Performed by: INTERNAL MEDICINE

## 2021-11-19 PROCEDURE — 90715 TDAP VACCINE 7 YRS/> IM: CPT | Performed by: INTERNAL MEDICINE

## 2021-11-19 PROCEDURE — 90686 IIV4 VACC NO PRSV 0.5 ML IM: CPT | Performed by: INTERNAL MEDICINE

## 2021-11-19 PROCEDURE — 99214 OFFICE O/P EST MOD 30 MIN: CPT | Performed by: INTERNAL MEDICINE

## 2021-11-19 PROCEDURE — 90472 IMMUNIZATION ADMIN EACH ADD: CPT | Performed by: INTERNAL MEDICINE

## 2021-11-19 RX ORDER — CITALOPRAM 20 MG/1
20 TABLET, FILM COATED ORAL DAILY
Qty: 90 TABLET | Refills: 2
Start: 2021-11-19 | End: 2022-08-10 | Stop reason: SDUPTHER

## 2021-11-19 RX ORDER — ZOSTER VACCINE RECOMBINANT, ADJUVANTED 50 MCG/0.5
0.5 KIT INTRAMUSCULAR ONCE
Qty: 0.5 ML | Refills: 1 | Status: SHIPPED | OUTPATIENT
Start: 2021-11-19 | End: 2021-11-19

## 2021-11-19 RX ORDER — CITALOPRAM 20 MG/1
20 TABLET, FILM COATED ORAL
COMMUNITY
End: 2021-11-19

## 2021-11-20 NOTE — PROGRESS NOTES
Subjective:   Anival Trujillo is a 49 y.o. male who was seen for Medication Evaluation and Other (Two herniated discs - gets epidural shots in neck - Dr. Salina Camargo )      Doing well. Recently remarried to Manas Moon. She has 3 children he has 1 son who is at [place]. Neck pain. DDD. Seeing Dr. Nona Huff. Recent injections have been helpful. Hx kidney stones and surgery 3/2020. No recurrence. Hyperlipidemia  Currently he takes simvastatin 20 mg  ROS: taking medications as instructed, no medication side effects noted  No new myalgias, no joint pains, no weakness  No TIA's, no chest pain on exertion, no dyspnea on exertion, no swelling of ankles. Lab Results   Component Value Date/Time    Cholesterol, total 184 06/16/2018 08:39 AM    HDL Cholesterol 40 06/16/2018 08:39 AM    LDL, calculated 120 (H) 06/16/2018 08:39 AM    VLDL, calculated 24 06/16/2018 08:39 AM    Triglyceride 121 06/16/2018 08:39 AM     Gout follow-up  Past gout history: acute gouty arthritis. Current gout treatment: allopurinol (Zyloprim). Side effects of current therapy: none. Progress since last visit:  no acute gout attacks since last clinic visit. .  The patient is attempting to avoid high purine foods and alcohol. Lab Results   Component Value Date/Time    Uric acid 5.9 06/16/2018 08:39 AM     Depression/anxiety. He is seeing [facility]. Taking celexa after trial off medications. Current Outpatient Medications   Medication Sig    citalopram (CELEXA) 20 mg tablet Take 1 Tablet by mouth daily.  simvastatin (ZOCOR) 20 mg tablet TAKE 1 TABLET BY MOUTH EVERY DAY AT NIGHT    allopurinoL (ZYLOPRIM) 300 mg tablet TAKE 1 TABLET BY MOUTH EVERY DAY    finasteride (PROSCAR) 5 mg tablet TAKE 1 TABLET BY MOUTH EVERY DAY    tretinoin 0.025 % tpical gel APPLY A PEA-SIZED AMOUNT TO FACE EXTERNALLY AT NIGHT    omega-3 fatty acids-vitamin e (FISH OIL) 1,000 mg cap Take 4 Caps by mouth daily.     biotin 1 mg tab Take 1 Tab by mouth daily.  MULTIVITAMIN PO Take  by mouth daily. No current facility-administered medications for this visit. No Known Allergies    Past Medical History:   Diagnosis Date    Cervical spine fracture ([address] 09/05/2014    DDD (degenerative disc disease), cervical     Dr. Raya Briscoe. Dr. Olivia Polanco. Injections 2019, 2021. MRI 6/2019 severe left foraminal stenosis with disc protrusion/osteophyte C4    Dysthymia 05/23/2018    [facility]. taking celexa since divorce 2018    Elective hair transplant for purposes other than remedying health states     Gout     History of angioedema 05/23/2018    saw allergist- testing neg for nut allergy    History of seizures as a child     childhood.  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 surgery 3/2020       ROS  All other systems reviewed and negative, unless mentioned in HPI    Objective:   Vital Signs: (As obtained by patient/caregiver at home)  Visit Vitals  /71 (BP 1 Location: Left upper arm, BP Patient Position: Sitting, BP Cuff Size: Adult)   Pulse 60   Temp 97.7 °F (36.5 °C) (Oral)   Resp 15   Ht 5' 10\" (1.778 m)   Wt 175 lb 6.4 oz (79.6 kg)   SpO2 99%   BMI 25.17 kg/m²        [INSTRUCTIONS:  \"[x]\" Indicates a positive item  \"[]\" Indicates a negative item  -- DELETE ALL ITEMS NOT EXAMINED]    Constitutional: [x] Appears well-developed and well-nourished [x] No apparent distress      [] Abnormal -     Mental status: [x] Alert and awake  [x] Oriented to person/place/time [x] Able to follow commands    [] Abnormal -     Eyes:   EOM    [x]  Normal    [] Abnormal -   Sclera  [x]  Normal    [] Abnormal -          Discharge [x]  None visible   [] Abnormal -     HENT: [x] Normocephalic, atraumatic  [] Abnormal -   [x] Mouth/Throat: Mucous membranes are moist    External Ears [x] Normal  [] Abnormal -    Neck: [x] No visualized mass [] Abnormal -     Pulmonary/Chest: [x] Respiratory effort normal   [x] No visualized signs of difficulty breathing or respiratory distress        [] Abnormal -      Musculoskeletal:   [x] Normal gait with no signs of ataxia         [x] Normal range of motion of neck        [] Abnormal -     Neurological:        [x] No Facial Asymmetry (Cranial nerve 7 motor function) (limited exam due to video visit)          [x] No gaze palsy        [] Abnormal -          Skin:        [x] No significant exanthematous lesions or discoloration noted on facial skin         [] Abnormal -            Psychiatric:       [x] Normal Affect [] Abnormal -        [x] No Hallucinations    Other pertinent observable physical exam findings:-      Diagnoses and all orders for this visit:    1. DDD (degenerative disc disease), cervical  F/u Dr. Nahomy Sin. 2. Pure hypercholesterolemia  This condition is controlled on current medication regimen as written in medication list.  Medications refilled. \  -     LIPID PANEL; Future  -     CBC W/O DIFF; Future  -     METABOLIC PANEL, COMPREHENSIVE; Future    3. Gout, unspecified cause, unspecified chronicity, unspecified site  This condition is controlled on current medication regimen as written in medication list.  Medications refilled. Currently asymptomatic  -     URIC ACID; Future    4. Screening PSA (prostate specific antigen)  -     PSA W/ REFLX FREE PSA; Future    5. Encounter for hepatitis C screening test for low risk patient  -     HEPATITIS C AB; Future    6. Dysthymia  This condition is controlled on current medication regimen as written in medication list.    Per psych  -     citalopram (CELEXA) 20 mg tablet; Take 1 Tablet by mouth daily. 7. Needs flu shot  -     INFLUENZA VIRUS VAC QUAD,SPLIT,PRESV FREE SYRINGE IM    8.  Encounter for immunization  -     TETANUS, DIPHTHERIA TOXOIDS AND ACELLULAR PERTUSSIS VACCINE (TDAP), IN INDIVIDS. >=7, IM  - DC IMMUNIZ ADMIN,1 SINGLE/COMB VAC/TOXOID    Other orders  -     Shingrix, PF, 50 mcg/0.5 mL susr injection; 0.5 mL by IntraMUSCular route once for 1 dose. Receive 2nd dose in 2-6 months. For Shingles (Zoster) prevention            We discussed the expected course, resolution and complications of the diagnosis(es) in detail. Medication risks, benefits, costs, interactions, and alternatives were discussed as indicated. I advised him to contact the office if his condition worsens, changes or fails to improve as anticipated. He expressed understanding with the diagnosis(es) and plan.

## 2021-11-23 LAB
ALBUMIN SERPL-MCNC: 4.6 G/DL (ref 3.8–4.9)
ALBUMIN/GLOB SERPL: 1.8 {RATIO} (ref 1.2–2.2)
ALP SERPL-CCNC: 68 IU/L (ref 44–121)
ALT SERPL-CCNC: 28 IU/L (ref 0–44)
AST SERPL-CCNC: 26 IU/L (ref 0–40)
BILIRUB SERPL-MCNC: 0.2 MG/DL (ref 0–1.2)
BUN SERPL-MCNC: 25 MG/DL (ref 6–24)
BUN/CREAT SERPL: 27 (ref 9–20)
CALCIUM SERPL-MCNC: 9.3 MG/DL (ref 8.7–10.2)
CHLORIDE SERPL-SCNC: 102 MMOL/L (ref 96–106)
CHOLEST SERPL-MCNC: 168 MG/DL (ref 100–199)
CO2 SERPL-SCNC: 23 MMOL/L (ref 20–29)
CREAT SERPL-MCNC: 0.92 MG/DL (ref 0.76–1.27)
ERYTHROCYTE [DISTWIDTH] IN BLOOD BY AUTOMATED COUNT: 12.7 % (ref 11.6–15.4)
GLOBULIN SER CALC-MCNC: 2.5 G/DL (ref 1.5–4.5)
GLUCOSE SERPL-MCNC: 101 MG/DL (ref 65–99)
HCT VFR BLD AUTO: 48.2 % (ref 37.5–51)
HCV AB S/CO SERPL IA: <0.1 S/CO RATIO (ref 0–0.9)
HDLC SERPL-MCNC: 37 MG/DL
HGB BLD-MCNC: 15.7 G/DL (ref 13–17.7)
IMP & REVIEW OF LAB RESULTS: NORMAL
LDLC SERPL CALC-MCNC: 93 MG/DL (ref 0–99)
MCH RBC QN AUTO: 28.3 PG (ref 26.6–33)
MCHC RBC AUTO-ENTMCNC: 32.6 G/DL (ref 31.5–35.7)
MCV RBC AUTO: 87 FL (ref 79–97)
PLATELET # BLD AUTO: 130 X10E3/UL (ref 150–450)
POTASSIUM SERPL-SCNC: 4.4 MMOL/L (ref 3.5–5.2)
PROT SERPL-MCNC: 7.1 G/DL (ref 6–8.5)
PSA SERPL-MCNC: 0.6 NG/ML (ref 0–4)
RBC # BLD AUTO: 5.54 X10E6/UL (ref 4.14–5.8)
REFLEX CRITERIA: NORMAL
SODIUM SERPL-SCNC: 138 MMOL/L (ref 134–144)
TRIGL SERPL-MCNC: 221 MG/DL (ref 0–149)
URATE SERPL-MCNC: 5.2 MG/DL (ref 3.8–8.4)
VLDLC SERPL CALC-MCNC: 38 MG/DL (ref 5–40)
WBC # BLD AUTO: 4.5 X10E3/UL (ref 3.4–10.8)

## 2022-02-17 ENCOUNTER — OFFICE VISIT (OUTPATIENT)
Dept: INTERNAL MEDICINE CLINIC | Age: 57
End: 2022-02-17
Payer: COMMERCIAL

## 2022-02-17 VITALS
RESPIRATION RATE: 16 BRPM | TEMPERATURE: 98.3 F | WEIGHT: 179.2 LBS | DIASTOLIC BLOOD PRESSURE: 75 MMHG | SYSTOLIC BLOOD PRESSURE: 113 MMHG | HEIGHT: 70 IN | HEART RATE: 60 BPM | OXYGEN SATURATION: 97 % | BODY MASS INDEX: 25.65 KG/M2

## 2022-02-17 DIAGNOSIS — N30.90 CYSTITIS: Primary | ICD-10-CM

## 2022-02-17 DIAGNOSIS — R31.9 HEMATURIA, UNSPECIFIED TYPE: ICD-10-CM

## 2022-02-17 LAB
BILIRUB UR QL STRIP: NEGATIVE
GLUCOSE UR-MCNC: NEGATIVE MG/DL
KETONES P FAST UR STRIP-MCNC: NEGATIVE MG/DL
PH UR STRIP: 5.5 [PH] (ref 4.6–8)
PROT UR QL STRIP: NEGATIVE
SP GR UR STRIP: 1.02 (ref 1–1.03)
UA UROBILINOGEN AMB POC: ABNORMAL (ref 0.2–1)
URINALYSIS CLARITY POC: ABNORMAL
URINALYSIS COLOR POC: YELLOW
URINE BLOOD POC: ABNORMAL
URINE LEUKOCYTES POC: NEGATIVE
URINE NITRITES POC: NEGATIVE

## 2022-02-17 PROCEDURE — 81001 URINALYSIS AUTO W/SCOPE: CPT | Performed by: INTERNAL MEDICINE

## 2022-02-17 PROCEDURE — 99213 OFFICE O/P EST LOW 20 MIN: CPT | Performed by: INTERNAL MEDICINE

## 2022-02-17 RX ORDER — CIPROFLOXACIN 500 MG/1
500 TABLET ORAL 2 TIMES DAILY
Qty: 14 TABLET | Refills: 0 | Status: SHIPPED | OUTPATIENT
Start: 2022-02-17 | End: 2022-03-01 | Stop reason: SDUPTHER

## 2022-02-17 NOTE — PROGRESS NOTES
Annika Villanueva is a 56 y.o. male who presents today for Urgency (RM18// pt presentng today with persistant urge to urinate but does not have to go started yesterday, no blood in urine no smell or discoloration)  . He has a history of   Patient Active Problem List   Diagnosis Code    Gout M10.9    Hyperlipidemia E78.5    Neck fracture ([facility] [address] S12. 9XXA    Dysthymia F34.1    History of angioedema Z87.898    Male pattern baldness L64.9    History of seizures as a child Z80.75    Nephrolith N20.0    ED (erectile dysfunction) of organic origin N52.9    DDD (degenerative disc disease), cervical M50.30    Lateral femoral cutaneous neuropathy, left G57.12   . Today patient is here for an acute visit. Urinary Problems:  Annika Villanueva is a 56 y.o. male who complains of frequency/urgency x 2 days, Weak stream. Without flank pain, fever, chills, nausea or vomiting. Urine has not been cloudy/foul smelling. his symptoms are moderate. he is drinking plenty of fluids for hydration. his history is significant for: Not many UTI's. Patient is on low-dose finasteride for hair growth not for prostate health. Patient denies any changes in sexual partners or habits. reports being sexually active and has had partner(s) who are female. .    No LMP for male patient. ROS  Review of Systems   Constitutional: Negative for chills, fever and weight loss. HENT: Negative for congestion and sore throat. Eyes: Negative for blurred vision, double vision and photophobia. Respiratory: Negative for cough and shortness of breath. Cardiovascular: Negative for chest pain, palpitations and leg swelling. Gastrointestinal: Negative for abdominal pain, constipation, diarrhea, heartburn, nausea and vomiting. Genitourinary: Positive for frequency and urgency. Negative for dysuria. Musculoskeletal: Negative for joint pain and myalgias. Skin: Negative for rash. Neurological: Negative. Negative for headaches. Endo/Heme/Allergies: Does not bruise/bleed easily. Psychiatric/Behavioral: Negative for memory loss and suicidal ideas. Visit Vitals  /75 (BP 1 Location: Left upper arm, BP Patient Position: Sitting, BP Cuff Size: Large adult)   Pulse 60   Temp 98.3 °F (36.8 °C) (Oral)   Resp 16   Ht 5' 10\" (1.778 m)   Wt 179 lb 3.2 oz (81.3 kg)   SpO2 97%   BMI 25.71 kg/m²       Physical Exam  Constitutional:       Appearance: He is well-developed. He is not diaphoretic. HENT:      Head: Normocephalic and atraumatic. Eyes:      Pupils: Pupils are equal, round, and reactive to light. Cardiovascular:      Rate and Rhythm: Normal rate and regular rhythm. Heart sounds: No murmur heard. Pulmonary:      Effort: Pulmonary effort is normal. No respiratory distress. Breath sounds: Normal breath sounds. Abdominal:      Comments: No tenderness, guarding or CVA tenderness. Musculoskeletal:         General: Normal range of motion. Skin:     General: Skin is warm and dry. Neurological:      Mental Status: He is alert and oriented to person, place, and time. Psychiatric:         Behavior: Behavior normal.           Current Outpatient Medications   Medication Sig    citalopram (CELEXA) 20 mg tablet Take 1 Tablet by mouth daily.  simvastatin (ZOCOR) 20 mg tablet TAKE 1 TABLET BY MOUTH EVERY DAY AT NIGHT    allopurinoL (ZYLOPRIM) 300 mg tablet TAKE 1 TABLET BY MOUTH EVERY DAY    finasteride (PROSCAR) 5 mg tablet TAKE 1 TABLET BY MOUTH EVERY DAY    omega-3 fatty acids-vitamin e (FISH OIL) 1,000 mg cap Take 4 Caps by mouth daily.  biotin 1 mg tab Take 1 Tab by mouth daily.  MULTIVITAMIN PO Take  by mouth daily.  tretinoin 0.025 % tpical gel APPLY A PEA-SIZED AMOUNT TO FACE EXTERNALLY AT NIGHT (Patient not taking: Reported on 2/17/2022)     No current facility-administered medications for this visit.         Past Medical History:   Diagnosis Date    Cervical spine fracture ([facility] [address] 09/05/2014  DDD (degenerative disc disease), cervical     Dr. Aura Christian. Dr. Dawne Aase. Injections 2019, 2021. MRI 6/2019 severe left foraminal stenosis with disc protrusion/osteophyte C4    Dysthymia 05/23/2018    [facility]. taking celexa since divorce 2018    Elective hair transplant for purposes other than remedying health states     Gout     History of angioedema 05/23/2018    saw allergist- testing neg for nut allergy    History of seizures as a child     childhood. 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 surgery 3/2020      Past Surgical History:   Procedure Laterality Date    HX CERVICAL FUSION  20014    c6-c7 fusion following fracture    HX COLONOSCOPY  02/15/2019    normal    HX REFRACTIVE SURGERY      HX UROLOGICAL  03/2020    KIDNEY STONE REMOVAL      Social History     Tobacco Use    Smoking status: Never Smoker    Smokeless tobacco: Never Used   Substance Use Topics    Alcohol use: Yes     Alcohol/week: 0.0 - 1.0 standard drinks      Family History   Problem Relation Age of Onset    Anxiety Mother     Breast Cancer Mother     Anxiety Father     Prostate Cancer Father     Anxiety Sister     Anxiety Brother     Other Brother         amyloidosis    Anesth Problems Neg Hx     Diabetes Neg Hx     Heart Disease Neg Hx     Hypertension Neg Hx         No Known Allergies     Assessment/Plan  Diagnoses and all orders for this visit:    1. Cystitis-we will treat for simple cystitis, but may have some degree of prostatitis. Patient will let us know if he does not improve or if his symptoms worsen after 7-day treatment. Will send for urine culture. -     ciprofloxacin HCl (CIPRO) 500 mg tablet; Take 1 Tablet by mouth two (2) times a day for 7 days.     2. Hematuria, unspecified type  -     AMB POC URINALYSIS DIP STICK AUTO W/ MICRO  - CULTURE, URINE; Future            Diana Doll MD  2/17/2022    This note was created with the help of speech recognition software [employer]) and may contain some 'sound alike' errors.

## 2022-02-17 NOTE — PROGRESS NOTES
Iesha Reyse  Identified pt with two pt identifiers(name and ). Chief Complaint   Patient presents with    Urgency     RM18// pt presentng today with persistant urge to urinate but does not have to go started yesterday, no blood in urine no smell or discoloration       1. Have you been to the ER, urgent care clinic since your last visit? Hospitalized since your last visit? NO    2. Have you seen or consulted any other health care providers outside of the [address] [place] [address] since your last visit? Include any pap smears or colon screening. NO      Provider notified of reason for visit, vitals and flowsheets obtained on patients.      Patient received paperwork for advance directive during previous visit but has not completed at this time     Reviewed record In preparation for visit, huddled with provider and have obtained necessary documentation      Health Maintenance Due   Topic    Shingrix Vaccine Age 50> (1 of 2)       Wt Readings from Last 3 Encounters:   22 179 lb 3.2 oz (81.3 kg)   21 175 lb 6.4 oz (79.6 kg)   20 188 lb (85.3 kg)     Temp Readings from Last 3 Encounters:   22 98.3 °F (36.8 °C) (Oral)   21 97.7 °F (36.5 °C) (Oral)   20 98.7 °F (37.1 °C)     BP Readings from Last 3 Encounters:   22 113/75   21 114/71   20 122/74     Pulse Readings from Last 3 Encounters:   22 60   21 60   20 64     Vitals:    22 1446   BP: 113/75   Pulse: 60   Resp: 16   Temp: 98.3 °F (36.8 °C)   TempSrc: Oral   SpO2: 97%   Weight: 179 lb 3.2 oz (81.3 kg)   Height: 5' 10\" (1.778 m)   PainSc:   0 - No pain         Learning Assessment:  :     Learning Assessment 2/3/2016   PRIMARY LEARNER Patient   PRIMARY LANGUAGE ENGLISH   ANSWERED BY patient   RELATIONSHIP SELF       Depression Screening:  :     3 most recent PHQ Screens 2022   Little interest or pleasure in doing things Several days   Feeling down, depressed, irritable, or hopeless Several days   Total Score PHQ 2 2       Fall Risk Assessment:  :     Fall Risk Assessment, last 12 mths 11/19/2021   Able to walk? Yes   Fall in past 12 months? 0   Do you feel unsteady? 0   Are you worried about falling 0       Abuse Screening:  :     Abuse Screening Questionnaire 11/19/2021 8/28/2020 6/19/2019 5/23/2018   Do you ever feel afraid of your partner? N N N N   Are you in a relationship with someone who physically or mentally threatens you? N N N N   Is it safe for you to go home? Y Y Y Y       ADL Screening:  :     ADL Assessment 11/19/2021   Feeding yourself No Help Needed   Getting from bed to chair No Help Needed   Getting dressed No Help Needed   Bathing or showering No Help Needed   Walk across the room (includes cane/walker) No Help Needed   Using the telphone No Help Needed   Taking your medications No Help Needed   Preparing meals No Help Needed   Managing money (expenses/bills) No Help Needed   Moderately strenuous housework (laundry) No Help Needed   Shopping for personal items (toiletries/medicines) No Help Needed   Shopping for groceries No Help Needed   Driving No Help Needed   Climbing a flight of stairs No Help Needed   Getting to places beyond walking distances No Help Needed         Medication reconciliation up to date and corrected with patient at this time.

## 2022-02-19 LAB
BACTERIA SPEC CULT: NORMAL
SERVICE CMNT-IMP: NORMAL

## 2022-03-01 DIAGNOSIS — N30.90 CYSTITIS: ICD-10-CM

## 2022-03-01 RX ORDER — CIPROFLOXACIN 500 MG/1
500 TABLET ORAL 2 TIMES DAILY
Qty: 28 TABLET | Refills: 0 | Status: SHIPPED | OUTPATIENT
Start: 2022-03-01 | End: 2022-03-15

## 2022-03-04 ENCOUNTER — TRANSCRIBE ORDER (OUTPATIENT)
Dept: SCHEDULING | Age: 57
End: 2022-03-04

## 2022-03-04 DIAGNOSIS — Z13.6 SCREENING FOR HEART DISEASE: Primary | ICD-10-CM

## 2022-03-15 ENCOUNTER — HOSPITAL ENCOUNTER (OUTPATIENT)
Dept: CT IMAGING | Age: 57
Discharge: HOME | End: 2022-03-15
Attending: SPECIALIST

## 2022-03-15 DIAGNOSIS — Z13.6 SCREENING FOR HEART DISEASE: ICD-10-CM

## 2022-03-15 PROBLEM — R93.1 AGATSTON CORONARY ARTERY CALCIUM SCORE LESS THAN 100: Status: ACTIVE | Noted: 2022-03-15

## 2022-03-15 PROCEDURE — 75571 CT HRT W/O DYE W/CA TEST: CPT

## 2022-03-15 RX ORDER — ROSUVASTATIN CALCIUM 10 MG/1
10 TABLET, COATED ORAL
Qty: 90 TABLET | Refills: 1 | Status: SHIPPED | OUTPATIENT
Start: 2022-03-15 | End: 2022-08-10 | Stop reason: SDUPTHER

## 2022-03-18 PROBLEM — M50.30 DDD (DEGENERATIVE DISC DISEASE), CERVICAL: Status: ACTIVE | Noted: 2019-06-19

## 2022-03-19 PROBLEM — F34.1 DYSTHYMIA: Status: ACTIVE | Noted: 2018-05-23

## 2022-03-19 PROBLEM — N52.9 ED (ERECTILE DYSFUNCTION) OF ORGANIC ORIGIN: Status: ACTIVE | Noted: 2019-03-27

## 2022-03-20 PROBLEM — Z87.898 HISTORY OF ANGIOEDEMA: Status: ACTIVE | Noted: 2018-05-23

## 2022-03-20 PROBLEM — L64.9 MALE PATTERN BALDNESS: Status: ACTIVE | Noted: 2018-05-23

## 2022-03-24 PROBLEM — R93.1 AGATSTON CORONARY ARTERY CALCIUM SCORE LESS THAN 100: Status: ACTIVE | Noted: 2022-03-15

## 2022-05-25 ENCOUNTER — PATIENT MESSAGE (OUTPATIENT)
Dept: INTERNAL MEDICINE CLINIC | Age: 57
End: 2022-05-25

## 2022-05-26 ENCOUNTER — TELEPHONE (OUTPATIENT)
Dept: INTERNAL MEDICINE CLINIC | Age: 57
End: 2022-05-26

## 2022-05-26 NOTE — TELEPHONE ENCOUNTER
T/C to patient to update that he is scheduled for 0920 AM today for his appt with Dr. Anam Sesay for ear infection. No answer and LVM.

## 2022-06-07 DIAGNOSIS — M10.9 GOUT, UNSPECIFIED CAUSE, UNSPECIFIED CHRONICITY, UNSPECIFIED SITE: Chronic | ICD-10-CM

## 2022-06-07 RX ORDER — ALLOPURINOL 300 MG/1
TABLET ORAL
Qty: 90 TABLET | Refills: 1 | Status: SHIPPED | OUTPATIENT
Start: 2022-06-07 | End: 2022-08-10 | Stop reason: SDUPTHER

## 2022-07-06 ENCOUNTER — TRANSCRIBE ORDER (OUTPATIENT)
Dept: SCHEDULING | Age: 57
End: 2022-07-06

## 2022-07-06 DIAGNOSIS — M50.30 DDD (DEGENERATIVE DISC DISEASE), CERVICAL: Primary | ICD-10-CM

## 2022-07-12 ENCOUNTER — HOSPITAL ENCOUNTER (OUTPATIENT)
Dept: MRI IMAGING | Age: 57
Discharge: HOME | End: 2022-07-12
Attending: NEUROLOGICAL SURGERY
Payer: COMMERCIAL

## 2022-07-12 VITALS — WEIGHT: 178 LBS | BODY MASS INDEX: 25.54 KG/M2

## 2022-07-12 DIAGNOSIS — M50.30 DDD (DEGENERATIVE DISC DISEASE), CERVICAL: ICD-10-CM

## 2022-07-12 PROCEDURE — 74011250636 HC RX REV CODE- 250/636: Performed by: RADIOLOGY

## 2022-07-12 PROCEDURE — 72156 MRI NECK SPINE W/O & W/DYE: CPT

## 2022-07-12 PROCEDURE — A9576 INJ PROHANCE MULTIPACK: HCPCS | Performed by: RADIOLOGY

## 2022-07-12 RX ADMIN — GADOTERIDOL 16 ML: 279.3 INJECTION, SOLUTION INTRAVENOUS at 20:03

## 2022-08-10 DIAGNOSIS — F34.1 DYSTHYMIA: ICD-10-CM

## 2022-08-10 DIAGNOSIS — M10.9 GOUT, UNSPECIFIED CAUSE, UNSPECIFIED CHRONICITY, UNSPECIFIED SITE: Chronic | ICD-10-CM

## 2022-08-10 DIAGNOSIS — L64.9 MALE PATTERN BALDNESS: ICD-10-CM

## 2022-08-10 RX ORDER — ALLOPURINOL 300 MG/1
300 TABLET ORAL DAILY
Qty: 7 TABLET | Refills: 0 | Status: SHIPPED | OUTPATIENT
Start: 2022-08-10

## 2022-08-10 RX ORDER — AMOXICILLIN AND CLAVULANATE POTASSIUM 875; 125 MG/1; MG/1
1 TABLET, FILM COATED ORAL 2 TIMES DAILY
Qty: 14 TABLET | Refills: 0 | Status: SHIPPED | OUTPATIENT
Start: 2022-08-10 | End: 2022-08-17

## 2022-08-10 RX ORDER — FINASTERIDE 5 MG/1
5 TABLET, FILM COATED ORAL DAILY
Qty: 7 TABLET | Refills: 0 | Status: SHIPPED | OUTPATIENT
Start: 2022-08-10 | End: 2022-09-18

## 2022-08-10 RX ORDER — ROSUVASTATIN CALCIUM 10 MG/1
10 TABLET, COATED ORAL
Qty: 7 TABLET | Refills: 0 | Status: SHIPPED | OUTPATIENT
Start: 2022-08-10 | End: 2022-09-05

## 2022-08-10 RX ORDER — NIRMATRELVIR AND RITONAVIR 300-100 MG
3 KIT ORAL EVERY 12 HOURS
Qty: 1 BOX | Refills: 0 | Status: SHIPPED | OUTPATIENT
Start: 2022-08-10 | End: 2022-08-15

## 2022-08-10 RX ORDER — CITALOPRAM 20 MG/1
20 TABLET, FILM COATED ORAL DAILY
Qty: 7 TABLET | Refills: 0 | Status: SHIPPED | OUTPATIENT
Start: 2022-08-10

## 2022-08-23 ENCOUNTER — OFFICE VISIT (OUTPATIENT)
Dept: INTERNAL MEDICINE CLINIC | Age: 57
End: 2022-08-23
Payer: COMMERCIAL

## 2022-08-23 VITALS
TEMPERATURE: 98.3 F | BODY MASS INDEX: 25.25 KG/M2 | HEIGHT: 70 IN | DIASTOLIC BLOOD PRESSURE: 77 MMHG | WEIGHT: 176.4 LBS | HEART RATE: 59 BPM | OXYGEN SATURATION: 97 % | SYSTOLIC BLOOD PRESSURE: 116 MMHG | RESPIRATION RATE: 16 BRPM

## 2022-08-23 DIAGNOSIS — U07.1 COVID-19: Primary | ICD-10-CM

## 2022-08-23 PROCEDURE — 99213 OFFICE O/P EST LOW 20 MIN: CPT | Performed by: INTERNAL MEDICINE

## 2022-08-23 RX ORDER — PREDNISONE 10 MG/1
TABLET ORAL
Qty: 20 TABLET | Refills: 0 | Status: SHIPPED | OUTPATIENT
Start: 2022-08-23 | End: 2022-08-31

## 2022-08-23 NOTE — PROGRESS NOTES
Note   Chief Complaint   srinivasa Padilla is a 56 y.o. male     1. COVID-19  -     predniSONE (DELTASONE) 10 mg tablet; Take 40 mg by mouth daily (with breakfast) for 2 days, THEN 30 mg daily (with breakfast) for 2 days, THEN 20 mg daily (with breakfast) for 2 days, THEN 10 mg daily (with breakfast) for 2 days. , Normal, Disp-20 Tablet, R-0   Tinnitus bothersome  Sinus congestion/drainage still there  Tried abx with no improvement  No ear pain  Constant hum that waxes and wanes   No ear drainage  No vertigo, lightheadedness  A little harder to hear   Possibly related to covid, eustachian tube dysfunction. Rec prednisone taper, nasal steroid spray. Advised to call if sx do not improve      Benefits, risks, possible drug interactions, and side effects of all new medications were reviewed with the patient. Pt verbalized understanding. Return to clinic:  as needed    An electronic signature was used to authenticate this note. Jasbir Jara MD  [facility]  8/23/2022    No future appointments. Objective   Vitals:       Visit Vitals  /77 (BP 1 Location: Left upper arm, BP Patient Position: Sitting, BP Cuff Size: Adult)   Pulse (!) 59   Temp 98.3 °F (36.8 °C) (Oral)   Resp 16   Ht 5' 10\" (1.778 m)   Wt 176 lb 6.4 oz (80 kg)   SpO2 97%   BMI 25.31 kg/m²        Physical Exam  Constitutional:       Appearance: Normal appearance. He is not ill-appearing. HENT:      Right Ear: Ear canal and external ear normal.      Left Ear: Ear canal and external ear normal.      Ears:      Comments: BL TM retracted, no effusion or erythema  Cardiovascular:      Rate and Rhythm: Normal rate and regular rhythm. Heart sounds: No murmur heard. No friction rub. No gallop. Pulmonary:      Effort: No respiratory distress. Breath sounds: Normal breath sounds. No wheezing, rhonchi or rales. Neurological:      Mental Status: He is alert.         Current Outpatient Medications   Medication Sig    oxymetazoline HCl (AFRIN NASAL SPRAY NA) by Nasal route as needed. predniSONE (DELTASONE) 10 mg tablet Take 40 mg by mouth daily (with breakfast) for 2 days, THEN 30 mg daily (with breakfast) for 2 days, THEN 20 mg daily (with breakfast) for 2 days, THEN 10 mg daily (with breakfast) for 2 days. allopurinoL (ZYLOPRIM) 300 mg tablet Take 1 Tablet by mouth in the morning. rosuvastatin (CRESTOR) 10 mg tablet Take 1 Tablet by mouth nightly. Replaces simvastatin 3/15/21    citalopram (CELEXA) 20 mg tablet Take 1 Tablet by mouth in the morning. finasteride (PROSCAR) 5 mg tablet Take 1 Tablet by mouth in the morning. omega-3 fatty acids-vitamin e 1,000 mg cap Take 4 Caps by mouth daily. biotin 1 mg tab Take 1 Tab by mouth daily. MULTIVITAMIN PO Take  by mouth daily. tretinoin 0.025 % tpical gel APPLY A PEA-SIZED AMOUNT TO FACE EXTERNALLY AT NIGHT     No current facility-administered medications for this visit.

## 2022-09-05 RX ORDER — ROSUVASTATIN CALCIUM 10 MG/1
TABLET, COATED ORAL
Qty: 90 TABLET | Refills: 1 | Status: SHIPPED | OUTPATIENT
Start: 2022-09-05

## 2022-09-06 DIAGNOSIS — H93.13 TINNITUS OF BOTH EARS: Primary | ICD-10-CM

## 2023-01-13 RX ORDER — AMOXICILLIN AND CLAVULANATE POTASSIUM 875; 125 MG/1; MG/1
1 TABLET, FILM COATED ORAL 2 TIMES DAILY
Qty: 14 TABLET | Refills: 0 | Status: SHIPPED | OUTPATIENT
Start: 2023-01-13 | End: 2023-01-20

## 2023-02-21 ENCOUNTER — HOSPITAL ENCOUNTER (OUTPATIENT)
Dept: PREADMISSION TESTING | Age: 58
Discharge: HOME | End: 2023-02-21
Payer: COMMERCIAL

## 2023-02-21 VITALS
HEART RATE: 59 BPM | DIASTOLIC BLOOD PRESSURE: 72 MMHG | SYSTOLIC BLOOD PRESSURE: 114 MMHG | BODY MASS INDEX: 27.3 KG/M2 | HEIGHT: 70 IN | TEMPERATURE: 98.6 F | WEIGHT: 190.7 LBS

## 2023-02-21 LAB
BASOPHILS # BLD: 0 K/UL (ref 0–0.1)
BASOPHILS NFR BLD: 1 % (ref 0–1)
DIFFERENTIAL METHOD BLD: NORMAL
EOSINOPHIL # BLD: 0.1 K/UL (ref 0–0.4)
EOSINOPHIL NFR BLD: 3 % (ref 0–7)
ERYTHROCYTE [DISTWIDTH] IN BLOOD BY AUTOMATED COUNT: 12.1 % (ref 11.5–14.5)
HCT VFR BLD AUTO: 41.5 % (ref 36.6–50.3)
HGB BLD-MCNC: 13.8 G/DL (ref 12.1–17)
IMM GRANULOCYTES # BLD AUTO: 0 K/UL (ref 0–0.04)
IMM GRANULOCYTES NFR BLD AUTO: 0 % (ref 0–0.5)
LYMPHOCYTES # BLD: 1.8 K/UL (ref 0.8–3.5)
LYMPHOCYTES NFR BLD: 34 % (ref 12–49)
MCH RBC QN AUTO: 28.3 PG (ref 26–34)
MCHC RBC AUTO-ENTMCNC: 33.3 G/DL (ref 30–36.5)
MCV RBC AUTO: 85 FL (ref 80–99)
MONOCYTES # BLD: 0.6 K/UL (ref 0–1)
MONOCYTES NFR BLD: 11 % (ref 5–13)
NEUTS SEG # BLD: 2.8 K/UL (ref 1.8–8)
NEUTS SEG NFR BLD: 51 % (ref 32–75)
NRBC # BLD: 0 K/UL (ref 0–0.01)
NRBC BLD-RTO: 0 PER 100 WBC
PLATELET # BLD AUTO: 237 K/UL (ref 150–400)
PMV BLD AUTO: 9.9 FL (ref 8.9–12.9)
RBC # BLD AUTO: 4.88 M/UL (ref 4.1–5.7)
WBC # BLD AUTO: 5.4 K/UL (ref 4.1–11.1)

## 2023-02-21 PROCEDURE — 36415 COLL VENOUS BLD VENIPUNCTURE: CPT

## 2023-02-21 PROCEDURE — 85025 COMPLETE CBC W/AUTO DIFF WBC: CPT

## 2023-02-21 NOTE — PERIOP NOTES
[address] INSTRUCTIONS    Surgery Date:   2/28    Your surgeon's office or [address] [place] [address] staff will call you between 4 PM- 8 PM the day before surgery with your arrival time. If your surgery is on a Monday, you will receive a call the preceding Friday. Please report to [facility] Patient Access/Admitting on the 1st floor. Bring your insurance card, photo identification, and any copayment ( if applicable). If you are going home the same day of your surgery, you must have a responsible adult to drive you home. You need to have a responsible adult to stay with you the first 24 hours after surgery and you should not drive a car for 24 hours following your surgery. Nothing to eat or drink after midnight the night before surgery. This includes no water, gum, mints, coffee, juice, etc.  Please note special instructions, if applicable, below for medications. Do NOT drink alcohol or smoke 24 hours before surgery. STOP smoking for 14 days prior as it helps with breathing and healing after surgery. If you are being admitted to the hospital, please leave personal belongings/luggage in your car until you have an assigned hospital room number. Please wear comfortable clothes. Wear your glasses instead of contacts. We ask that all money, jewelry and valuables be left at home. Wear no make up, particularly mascara, the day of surgery. All body piercings, rings, and jewelry need to be removed and left at home. Please remove any nail polish or artificial nails from your fingernails. Please wear your hair loose or down. Please no pony-tails, buns, or any metal hair accessories. If you shower the morning of surgery, please do not apply any lotions or powders afterwards. You may wear deodorant, unless having breast surgery. Do not shave any body area within 24 hours of your surgery. Please follow all instructions to avoid any potential surgical cancellation.   Should your physical condition change, (i.e. fever, cold, flu, etc.) please notify your surgeon as soon as possible. It is important to be on time. If a situation occurs where you may be delayed, please call:  [phone] / [phone] on the day of surgery. The Preadmission Testing staff can be reached at [phone]. Special instructions: BRING LIVING WILL FI DONE TO THE HOSPITAL      Current Outpatient Medications   Medication Sig    APPLE CIDER VINEGAR PO Take 1,200 mg by mouth daily. allopurinoL (ZYLOPRIM) 300 mg tablet TAKE 1 TABLET BY MOUTH EVERY DAY (Patient taking differently: 300 mg nightly.)    finasteride (PROSCAR) 5 mg tablet TAKE 1 TABLET BY MOUTH EVERY DAY IN THE MORNING (Patient taking differently: 1 mg nightly.)    rosuvastatin (CRESTOR) 10 mg tablet TAKE 1 TABLET BY MOUTH EVERY DAY NIGHTLY *REPLACES SIMVASTATIN    citalopram (CELEXA) 20 mg tablet Take 1 Tablet by mouth in the morning. (Patient taking differently: Take 40 mg by mouth every evening.)    omega-3 fatty acids-vitamin e 1,000 mg cap Take 4 Caps by mouth daily. biotin 1 mg tab Take 1 Tab by mouth daily. No current facility-administered medications for this encounter. YOU MUST ONLY TAKE THESE MEDICATIONS THE MORNING OF SURGERY WITH A SIP OF WATER: NONE  MEDICATIONS TO TAKE THE MORNING OF SURGERY ONLY IF NEEDED: NONE  HOLD these prescription medications BEFORE Surgery: STARTING TODAY 2/21 STOP BIOTEN FISH OIL AND APPLE CIDER VINEGAR  Ask your surgeon/prescribing physician about when/if to STOP taking these medications: NONE  Stop all vitamins, herbal medicines and Aspirin containing products 7 days prior to surgery. Stop any non-steroidal anti-inflammatory drugs (i.e. Ibuprofen, Naproxen, Advil, Aleve) 3 days before surgery. You may take Tylenol. If you are currently taking Plavix, Coumadin, or any other blood-thinning/anticoagulant medication contact your prescribing physician for instructions.     Preventing Infections Before and After - Your Surgery    IMPORTANT INSTRUCTIONS      You play an important role in your health and preparation for surgery. To reduce the germs on your skin you will need to shower with CHG soap (Chorhexidine gluconate 4%) two times before surgery. CHG soap (Hibiclens, Hex-A-Clens or store brand)  CHG soap will be provided at your Preadmission Testing (PAT) appointment. If you do not have a PAT appointment before surgery, you may arrange to  CHG soap from our office or purchase CHG soap at a pharmacy, grocery or department store. You need to purchase TWO 4 ounce bottles to use for your 2 showers. Steps to follow:  Sushma Marker your hair with your normal shampoo and your body with regular soap and rinse well to remove shampoo and soap from your skin. Wet a clean washcloth and turn off the shower. Put CHG soap on washcloth and apply to your entire body from the neck down. Do not use on your head, face or private parts(genitals). Do not use CHG soap on open sores, wounds or areas of skin irritation. Wash you body gently for 5 minutes. Do not wash your skin too hard. This soap does not create lather. Pay special attention to your underarms and from your belly button to your feet. Turn the shower back on and rinse well to get CHG soap off your body. Pat your skin dry with a clean, dry towel. Do not apply lotions or moisturizer. Put on clean clothes and sleep on fresh bed sheets and do not allow pets to sleep with you. Shower with CHG soap 2 times before your surgery  The evening before your surgery  The morning of your surgery      Tips to help prevent infections after your surgery:  Protect your surgical wound from germs:  Hand washing is the most important thing you and your caregivers can do to prevent infections. Keep your bandage clean and dry! Do not touch your surgical wound. Use clean, freshly washed towels and washcloths every time you shower; do not share bath linens with others.   Until your surgical wound is healed, wear clothing and sleep on bed linens each day that are clean and freshly washed. Do not allow pets to sleep in your bed with you or touch your surgical wound. Do not smoke - smoking delays wound healing. This may be a good time to stop smoking. If you have diabetes, it is important for you to manage your blood sugar levels properly before your surgery as well as after your surgery. Poorly managed blood sugar levels slow down wound healing and prevent you from healing completely. Patient Information Regarding COVID Restrictions    Day of Procedure    Please park in the parking deck or any designated visitor parking lot. Enter the facility through the Main Entrance of the hospital.  On the day of surgery, please provide the cell phone number of the person who will be waiting for you to the Patient Access representative at the time of registration. Masks are highly recommended in the hospital, but not required. Once your procedure and the immediate recovery period is completed, a nurse in the recovery area will contact your designated visitor to inform them of your room number or to otherwise review other pertinent information regarding your care. Social distancing practices are strongly encouraged in waiting areas and the cafeteria. The patient was contacted  in person. He verbalized understanding of all instructions does not  need reinforcement.

## 2023-02-22 LAB
BACTERIA SPEC CULT: NORMAL
BACTERIA SPEC CULT: NORMAL
SERVICE CMNT-IMP: NORMAL

## 2023-02-22 NOTE — PERIOP NOTES
PAT Nurse Practitioner   Pre-Operative Chart Review/Assessment:-ORTHOPEDIC/NEUROSURGICAL SPINE                Patient Name:  Prince Philip                                                           Age:   66 y.o.    :  1965     Today's Date:  2023     Date of PAT:   23      Date of Surgery:    23      Procedure(s):  C4-C6 ACDF     Surgeon:   Dr. Campbell Bence:       1)  PCP: Harle Bamberger, MD        2)  Cardiac Clearance: Not requested. 3)  Diabetic Treatment Consult: A1c not ordered. No hx of DM. 4)  Sleep Apnea evaluation:  Not indicated. SEBASTIEN Score 2.       5)  Treatment for MRSA/Staph Aureus: Neg       6)  Additional Concerns: None. Vital Signs:         Vitals:    23 1518   BP: 114/72   Pulse: (!) 59   Temp: 98.6 °F (37 °C)   Weight: 86.5 kg (190 lb 11.2 oz)   Height: 5' 10\" (1.778 m)          Body mass index is 27.36 kg/m².       ____________________________________________  PAST MEDICAL HISTORY  Past Medical History:   Diagnosis Date    Agatston coronary artery calcium score less than 100 03/15/2022    score of 95, 70th percentile rank    Cervical spine fracture ([address] 2014    DDD (degenerative disc disease), cervical     Dr. David Welsh. Dr. Erika Jose. Injections 2021. MRI 2019 severe left foraminal stenosis with disc protrusion/osteophyte C4    Dysthymia 2018    Grace therapy. taking celexa since divorce     Elective hair transplant for purposes other than remedying health states     Gout     History of angioedema 2018    saw allergist- testing neg for nut allergy    History of seizures as a child     childhood.  took Pb, dilantin until 16    Hyperlipidemia 2014    Lateral femoral cutaneous neuropathy, left     Male pattern baldness 2018    s/p hair transplant, takes finasteride    Nephrolith     x 2. surgery 3/2020      ____________________________________________  PAST SURGICAL HISTORY  Past Surgical History: Procedure Laterality Date    HX CERVICAL FUSION  20014    c6-c7 fusion following fracture    HX COLONOSCOPY  02/15/2019    normal    HX GI      COLONOSCOPY    HX HEENT  2020    LASIK    HX REFRACTIVE SURGERY      HX UROLOGICAL  03/2020    KIDNEY STONE REMOVAL     ____________________________________________  HOME MEDICATIONS    Current Outpatient Medications   Medication Sig    APPLE CIDER VINEGAR PO Take 1,200 mg by mouth daily. allopurinoL (ZYLOPRIM) 300 mg tablet TAKE 1 TABLET BY MOUTH EVERY DAY (Patient taking differently: 300 mg nightly.)    finasteride (PROSCAR) 5 mg tablet TAKE 1 TABLET BY MOUTH EVERY DAY IN THE MORNING (Patient taking differently: 1 mg nightly.)    rosuvastatin (CRESTOR) 10 mg tablet TAKE 1 TABLET BY MOUTH EVERY DAY NIGHTLY *REPLACES SIMVASTATIN    citalopram (CELEXA) 20 mg tablet Take 1 Tablet by mouth in the morning. (Patient taking differently: Take 40 mg by mouth every evening.)    omega-3 fatty acids-vitamin e 1,000 mg cap Take 4 Caps by mouth daily. biotin 1 mg tab Take 1 Tab by mouth daily.      No current facility-administered medications for this encounter.      ____________________________________________  ALLERGIES  No Known Allergies   ____________________________________________  SOCIAL HISTORY  Social History     Tobacco Use    Smoking status: Never    Smokeless tobacco: Never   Substance Use Topics    Alcohol use: Not Currently     Alcohol/week: 0.0 - 1.0 standard drinks      ____________________________________________   Internal Administration   First Dose COVID-19, PFIZER PURPLE top, DILUTE for use, (age 15 y+), IM, 30mcg/0.3mL  03/05/2021   Second Dose COVID-19, PFIZER PURPLE top, DILUTE for use, (age 15 y+), IM, 30mcg/0.3mL  03/26/2021      Last COVID Lab No results found for: Jaquan Cedillo, RCV2CT, CVD2M, Wynelle Lanes, Mevelyn Seminole, [facility], [address] [place] [address], [address], [address], Thais Proper               ____________________________________________    Labs: Hospital Outpatient Visit on 02/21/2023   Component Date Value Ref Range Status    WBC 02/21/2023 5.4  4.1 - 11.1 K/uL Final    RBC 02/21/2023 4.88  4.10 - 5.70 M/uL Final    HGB 02/21/2023 13.8  12.1 - 17.0 g/dL Final    HCT 02/21/2023 41.5  36.6 - 50.3 % Final    MCV 02/21/2023 85.0  80.0 - 99.0 FL Final    MCH 02/21/2023 28.3  26.0 - 34.0 PG Final    MCHC 02/21/2023 33.3  30.0 - 36.5 g/dL Final    RDW 02/21/2023 12.1  11.5 - 14.5 % Final    PLATELET 63/11/1093 357  150 - 400 K/uL Final    MPV 02/21/2023 9.9  8.9 - 12.9 FL Final    NRBC 02/21/2023 0.0  0  WBC Final    ABSOLUTE NRBC 02/21/2023 0.00  0.00 - 0.01 K/uL Final    NEUTROPHILS 02/21/2023 51  32 - 75 % Final    LYMPHOCYTES 02/21/2023 34  12 - 49 % Final    MONOCYTES 02/21/2023 11  5 - 13 % Final    EOSINOPHILS 02/21/2023 3  0 - 7 % Final    BASOPHILS 02/21/2023 1  0 - 1 % Final    IMMATURE GRANULOCYTES 02/21/2023 0  0.0 - 0.5 % Final    ABS. NEUTROPHILS 02/21/2023 2.8  1.8 - 8.0 K/UL Final    ABS. LYMPHOCYTES 02/21/2023 1.8  0.8 - 3.5 K/UL Final    ABS. MONOCYTES 02/21/2023 0.6  0.0 - 1.0 K/UL Final    ABS. EOSINOPHILS 02/21/2023 0.1  0.0 - 0.4 K/UL Final    ABS. BASOPHILS 02/21/2023 0.0  0.0 - 0.1 K/UL Final    ABS. IMM. GRANS. 02/21/2023 0.0  0.00 - 0.04 K/UL Final    DF 02/21/2023 AUTOMATED    Final    Special Requests: 02/21/2023 NO SPECIAL REQUESTS    Final    Culture result: 02/21/2023 MRSA NOT PRESENT    Final    Culture result: 02/21/2023     Final                    Value:Screening of patient nares for MRSA is for surveillance purposes and, if positive, to facilitate isolation considerations in high risk settings. It is not intended for automatic decolonization interventions per se as regimens are not sufficiently effective to warrant routine use. Skin:     Denies open wounds, cuts, sores, rashes or other areas of concern in PAT assessment.         Michele Salazar NP  Available via [address] [place] [address]

## 2023-02-28 ENCOUNTER — APPOINTMENT (OUTPATIENT)
Dept: GENERAL RADIOLOGY | Age: 58
End: 2023-02-28
Attending: NEUROLOGICAL SURGERY
Payer: COMMERCIAL

## 2023-02-28 ENCOUNTER — ANESTHESIA EVENT (OUTPATIENT)
Dept: SURGERY | Age: 58
End: 2023-02-28
Payer: COMMERCIAL

## 2023-02-28 ENCOUNTER — ANESTHESIA (OUTPATIENT)
Dept: SURGERY | Age: 58
End: 2023-02-28
Payer: COMMERCIAL

## 2023-02-28 ENCOUNTER — HOSPITAL ENCOUNTER (OUTPATIENT)
Age: 58
Setting detail: OBSERVATION
Discharge: HOME | End: 2023-03-01
Attending: NEUROLOGICAL SURGERY | Admitting: NEUROLOGICAL SURGERY
Payer: COMMERCIAL

## 2023-02-28 DIAGNOSIS — F34.1 DYSTHYMIA: ICD-10-CM

## 2023-02-28 DIAGNOSIS — Z98.1 S/P CERVICAL SPINAL FUSION: Primary | ICD-10-CM

## 2023-02-28 PROBLEM — M48.02 CERVICAL SPINAL STENOSIS: Status: ACTIVE | Noted: 2023-02-28

## 2023-02-28 LAB
GLUCOSE BLD STRIP.AUTO-MCNC: 99 MG/DL (ref 65–117)
SERVICE CMNT-IMP: NORMAL

## 2023-02-28 PROCEDURE — 74011250636 HC RX REV CODE- 250/636: Performed by: NURSE ANESTHETIST, CERTIFIED REGISTERED

## 2023-02-28 PROCEDURE — C1713 ANCHOR/SCREW BN/BN,TIS/BN: HCPCS | Performed by: NEUROLOGICAL SURGERY

## 2023-02-28 PROCEDURE — 77030004391 HC BUR FLUT MEDT -C: Performed by: NEUROLOGICAL SURGERY

## 2023-02-28 PROCEDURE — 74011250637 HC RX REV CODE- 250/637: Performed by: ANESTHESIOLOGY

## 2023-02-28 PROCEDURE — 74011250636 HC RX REV CODE- 250/636: Performed by: ANESTHESIOLOGY

## 2023-02-28 PROCEDURE — 77030003832 HC BIT DRL ATLNTS MEDT -B: Performed by: NEUROLOGICAL SURGERY

## 2023-02-28 PROCEDURE — 76010000172 HC OR TIME 2.5 TO 3 HR INTENSV-TIER 1: Performed by: NEUROLOGICAL SURGERY

## 2023-02-28 PROCEDURE — C1889 IMPLANT/INSERT DEVICE, NOC: HCPCS | Performed by: NEUROLOGICAL SURGERY

## 2023-02-28 PROCEDURE — 77030003029 HC SUT VCRL J&J -B: Performed by: NEUROLOGICAL SURGERY

## 2023-02-28 PROCEDURE — 76210000017 HC OR PH I REC 1.5 TO 2 HR: Performed by: NEUROLOGICAL SURGERY

## 2023-02-28 PROCEDURE — 76060000036 HC ANESTHESIA 2.5 TO 3 HR: Performed by: NEUROLOGICAL SURGERY

## 2023-02-28 PROCEDURE — 77030019908 HC STETH ESOPH SIMS -A: Performed by: ANESTHESIOLOGY

## 2023-02-28 PROCEDURE — 77030010507 HC ADH SKN DERMBND J&J -B: Performed by: NEUROLOGICAL SURGERY

## 2023-02-28 PROCEDURE — G0378 HOSPITAL OBSERVATION PER HR: HCPCS

## 2023-02-28 PROCEDURE — 74011000250 HC RX REV CODE- 250: Performed by: NEUROLOGICAL SURGERY

## 2023-02-28 PROCEDURE — 72020 X-RAY EXAM OF SPINE 1 VIEW: CPT

## 2023-02-28 PROCEDURE — 77030002933 HC SUT MCRYL J&J -A: Performed by: NEUROLOGICAL SURGERY

## 2023-02-28 PROCEDURE — L0120 CERV FLEX N/ADJ FOAM PRE OTS: HCPCS | Performed by: NEUROLOGICAL SURGERY

## 2023-02-28 PROCEDURE — 77030034475 HC MISC IMPL SPN: Performed by: NEUROLOGICAL SURGERY

## 2023-02-28 PROCEDURE — 77030029099 HC BN WAX SSPC -A: Performed by: NEUROLOGICAL SURGERY

## 2023-02-28 PROCEDURE — 74011250636 HC RX REV CODE- 250/636: Performed by: NEUROLOGICAL SURGERY

## 2023-02-28 PROCEDURE — 77030026438 HC STYL ET INTUB CARD -A: Performed by: ANESTHESIOLOGY

## 2023-02-28 PROCEDURE — 74011000250 HC RX REV CODE- 250: Performed by: NURSE ANESTHETIST, CERTIFIED REGISTERED

## 2023-02-28 PROCEDURE — 2709999900 HC NON-CHARGEABLE SUPPLY: Performed by: NEUROLOGICAL SURGERY

## 2023-02-28 PROCEDURE — 77030013079 HC BLNKT BAIR HGGR 3M -A: Performed by: ANESTHESIOLOGY

## 2023-02-28 PROCEDURE — 77030008684 HC TU ET CUF COVD -B: Performed by: ANESTHESIOLOGY

## 2023-02-28 PROCEDURE — 74011250637 HC RX REV CODE- 250/637: Performed by: NEUROLOGICAL SURGERY

## 2023-02-28 PROCEDURE — 77030038600 HC TU BPLR IRR DISP STRY -B: Performed by: NEUROLOGICAL SURGERY

## 2023-02-28 PROCEDURE — 82962 GLUCOSE BLOOD TEST: CPT

## 2023-02-28 PROCEDURE — 74011250637 HC RX REV CODE- 250/637: Performed by: PHYSICIAN ASSISTANT

## 2023-02-28 PROCEDURE — 77030014650 HC SEAL MTRX FLOSEL BAXT -C: Performed by: NEUROLOGICAL SURGERY

## 2023-02-28 DEVICE — PLATE 3001019 ZEVO 19MM 1 LVL
Type: IMPLANTABLE DEVICE | Site: SPINE CERVICAL | Status: FUNCTIONAL
Brand: ZEVO™ ANTERIOR CERVICAL PLATE SYSTEM

## 2023-02-28 DEVICE — BONE SCREW, LOCKING, 3.5MM X 14MM
Type: IMPLANTABLE DEVICE | Site: SPINE CERVICAL | Status: FUNCTIONAL
Brand: ENDOSKELETON® TCS

## 2023-02-28 DEVICE — GRAFT BNE SUB SM CANC FRZN MORSELIZED W/ VIABLE CELL: Type: IMPLANTABLE DEVICE | Site: SPINE CERVICAL | Status: FUNCTIONAL

## 2023-02-28 DEVICE — INTERBODY FUSION DEVICE  6 DEGREE MEDIUM 7MM
Type: IMPLANTABLE DEVICE | Site: SPINE CERVICAL | Status: FUNCTIONAL
Brand: ENDOSKELETON® TCS NANOLOCK® SURFACE TECHNOLOGY

## 2023-02-28 RX ORDER — FENTANYL CITRATE 50 UG/ML
50 INJECTION, SOLUTION INTRAMUSCULAR; INTRAVENOUS AS NEEDED
Status: DISCONTINUED | OUTPATIENT
Start: 2023-02-28 | End: 2023-02-28 | Stop reason: HOSPADM

## 2023-02-28 RX ORDER — ROCURONIUM BROMIDE 10 MG/ML
INJECTION, SOLUTION INTRAVENOUS AS NEEDED
Status: DISCONTINUED | OUTPATIENT
Start: 2023-02-28 | End: 2023-02-28 | Stop reason: HOSPADM

## 2023-02-28 RX ORDER — LIDOCAINE HYDROCHLORIDE 20 MG/ML
INJECTION, SOLUTION EPIDURAL; INFILTRATION; INTRACAUDAL; PERINEURAL AS NEEDED
Status: DISCONTINUED | OUTPATIENT
Start: 2023-02-28 | End: 2023-02-28 | Stop reason: HOSPADM

## 2023-02-28 RX ORDER — ALLOPURINOL 300 MG/1
300 TABLET ORAL DAILY
Status: DISCONTINUED | OUTPATIENT
Start: 2023-03-01 | End: 2023-03-01 | Stop reason: HOSPADM

## 2023-02-28 RX ORDER — DEXAMETHASONE SODIUM PHOSPHATE 4 MG/ML
4 INJECTION, SOLUTION INTRA-ARTICULAR; INTRALESIONAL; INTRAMUSCULAR; INTRAVENOUS; SOFT TISSUE EVERY 8 HOURS
Status: COMPLETED | OUTPATIENT
Start: 2023-02-28 | End: 2023-03-01

## 2023-02-28 RX ORDER — CITALOPRAM 20 MG/1
20 TABLET, FILM COATED ORAL DAILY
Status: DISCONTINUED | OUTPATIENT
Start: 2023-03-01 | End: 2023-03-01 | Stop reason: HOSPADM

## 2023-02-28 RX ORDER — FENTANYL CITRATE 50 UG/ML
25 INJECTION, SOLUTION INTRAMUSCULAR; INTRAVENOUS
Status: COMPLETED | OUTPATIENT
Start: 2023-02-28 | End: 2023-02-28

## 2023-02-28 RX ORDER — SODIUM CHLORIDE 9 MG/ML
125 INJECTION, SOLUTION INTRAVENOUS CONTINUOUS
Status: DISPENSED | OUTPATIENT
Start: 2023-02-28 | End: 2023-03-01

## 2023-02-28 RX ORDER — TRAMADOL HYDROCHLORIDE 50 MG/1
50 TABLET ORAL
Status: DISCONTINUED | OUTPATIENT
Start: 2023-02-28 | End: 2023-03-01 | Stop reason: HOSPADM

## 2023-02-28 RX ORDER — MIDAZOLAM HYDROCHLORIDE 1 MG/ML
0.5 INJECTION, SOLUTION INTRAMUSCULAR; INTRAVENOUS
Status: DISCONTINUED | OUTPATIENT
Start: 2023-02-28 | End: 2023-02-28 | Stop reason: HOSPADM

## 2023-02-28 RX ORDER — MIDAZOLAM HYDROCHLORIDE 1 MG/ML
1 INJECTION, SOLUTION INTRAMUSCULAR; INTRAVENOUS AS NEEDED
Status: DISCONTINUED | OUTPATIENT
Start: 2023-02-28 | End: 2023-02-28 | Stop reason: HOSPADM

## 2023-02-28 RX ORDER — SODIUM CHLORIDE 0.9 % (FLUSH) 0.9 %
5-40 SYRINGE (ML) INJECTION AS NEEDED
Status: DISCONTINUED | OUTPATIENT
Start: 2023-02-28 | End: 2023-02-28 | Stop reason: HOSPADM

## 2023-02-28 RX ORDER — OXYCODONE HYDROCHLORIDE 5 MG/1
5 TABLET ORAL AS NEEDED
Status: DISCONTINUED | OUTPATIENT
Start: 2023-02-28 | End: 2023-02-28 | Stop reason: HOSPADM

## 2023-02-28 RX ORDER — FINASTERIDE 5 MG/1
5 TABLET, FILM COATED ORAL DAILY
Status: DISCONTINUED | OUTPATIENT
Start: 2023-03-01 | End: 2023-03-01 | Stop reason: HOSPADM

## 2023-02-28 RX ORDER — SODIUM CHLORIDE, SODIUM LACTATE, POTASSIUM CHLORIDE, CALCIUM CHLORIDE 600; 310; 30; 20 MG/100ML; MG/100ML; MG/100ML; MG/100ML
INJECTION, SOLUTION INTRAVENOUS
Status: DISCONTINUED | OUTPATIENT
Start: 2023-02-28 | End: 2023-02-28 | Stop reason: HOSPADM

## 2023-02-28 RX ORDER — SODIUM CHLORIDE 0.9 % (FLUSH) 0.9 %
5-40 SYRINGE (ML) INJECTION EVERY 8 HOURS
Status: DISCONTINUED | OUTPATIENT
Start: 2023-02-28 | End: 2023-02-28 | Stop reason: HOSPADM

## 2023-02-28 RX ORDER — NORETHINDRONE AND ETHINYL ESTRADIOL 0.5-0.035
KIT ORAL AS NEEDED
Status: DISCONTINUED | OUTPATIENT
Start: 2023-02-28 | End: 2023-02-28 | Stop reason: HOSPADM

## 2023-02-28 RX ORDER — DEXAMETHASONE SODIUM PHOSPHATE 4 MG/ML
INJECTION, SOLUTION INTRA-ARTICULAR; INTRALESIONAL; INTRAMUSCULAR; INTRAVENOUS; SOFT TISSUE AS NEEDED
Status: DISCONTINUED | OUTPATIENT
Start: 2023-02-28 | End: 2023-02-28 | Stop reason: HOSPADM

## 2023-02-28 RX ORDER — SODIUM CHLORIDE 0.9 % (FLUSH) 0.9 %
5-40 SYRINGE (ML) INJECTION EVERY 8 HOURS
Status: DISCONTINUED | OUTPATIENT
Start: 2023-02-28 | End: 2023-03-01 | Stop reason: HOSPADM

## 2023-02-28 RX ORDER — NALOXONE HYDROCHLORIDE 0.4 MG/ML
0.4 INJECTION, SOLUTION INTRAMUSCULAR; INTRAVENOUS; SUBCUTANEOUS AS NEEDED
Status: DISCONTINUED | OUTPATIENT
Start: 2023-02-28 | End: 2023-03-01 | Stop reason: HOSPADM

## 2023-02-28 RX ORDER — ACETAMINOPHEN 325 MG/1
650 TABLET ORAL ONCE
Status: COMPLETED | OUTPATIENT
Start: 2023-02-28 | End: 2023-02-28

## 2023-02-28 RX ORDER — LIDOCAINE HYDROCHLORIDE 10 MG/ML
0.1 INJECTION, SOLUTION EPIDURAL; INFILTRATION; INTRACAUDAL; PERINEURAL AS NEEDED
Status: DISCONTINUED | OUTPATIENT
Start: 2023-02-28 | End: 2023-02-28 | Stop reason: HOSPADM

## 2023-02-28 RX ORDER — SODIUM CHLORIDE, SODIUM LACTATE, POTASSIUM CHLORIDE, CALCIUM CHLORIDE 600; 310; 30; 20 MG/100ML; MG/100ML; MG/100ML; MG/100ML
25 INJECTION, SOLUTION INTRAVENOUS CONTINUOUS
Status: DISCONTINUED | OUTPATIENT
Start: 2023-02-28 | End: 2023-02-28 | Stop reason: HOSPADM

## 2023-02-28 RX ORDER — ROPIVACAINE HYDROCHLORIDE 5 MG/ML
30 INJECTION, SOLUTION EPIDURAL; INFILTRATION; PERINEURAL AS NEEDED
Status: DISCONTINUED | OUTPATIENT
Start: 2023-02-28 | End: 2023-02-28 | Stop reason: HOSPADM

## 2023-02-28 RX ORDER — PROPOFOL 10 MG/ML
INJECTION, EMULSION INTRAVENOUS AS NEEDED
Status: DISCONTINUED | OUTPATIENT
Start: 2023-02-28 | End: 2023-02-28 | Stop reason: HOSPADM

## 2023-02-28 RX ORDER — ONDANSETRON 2 MG/ML
4 INJECTION INTRAMUSCULAR; INTRAVENOUS
Status: ACTIVE | OUTPATIENT
Start: 2023-02-28 | End: 2023-03-01

## 2023-02-28 RX ORDER — SODIUM CHLORIDE 0.9 % (FLUSH) 0.9 %
5-40 SYRINGE (ML) INJECTION AS NEEDED
Status: DISCONTINUED | OUTPATIENT
Start: 2023-02-28 | End: 2023-03-01 | Stop reason: HOSPADM

## 2023-02-28 RX ORDER — HYDROMORPHONE HYDROCHLORIDE 1 MG/ML
0.2 INJECTION, SOLUTION INTRAMUSCULAR; INTRAVENOUS; SUBCUTANEOUS
Status: DISCONTINUED | OUTPATIENT
Start: 2023-02-28 | End: 2023-02-28 | Stop reason: HOSPADM

## 2023-02-28 RX ORDER — OXYCODONE HYDROCHLORIDE 5 MG/1
10 TABLET ORAL
Status: DISCONTINUED | OUTPATIENT
Start: 2023-02-28 | End: 2023-03-01 | Stop reason: HOSPADM

## 2023-02-28 RX ORDER — ONDANSETRON 2 MG/ML
INJECTION INTRAMUSCULAR; INTRAVENOUS AS NEEDED
Status: DISCONTINUED | OUTPATIENT
Start: 2023-02-28 | End: 2023-02-28 | Stop reason: HOSPADM

## 2023-02-28 RX ORDER — AMOXICILLIN 250 MG
1 CAPSULE ORAL 2 TIMES DAILY
Status: DISCONTINUED | OUTPATIENT
Start: 2023-02-28 | End: 2023-03-01 | Stop reason: HOSPADM

## 2023-02-28 RX ORDER — HYDROMORPHONE HYDROCHLORIDE 2 MG/ML
INJECTION, SOLUTION INTRAMUSCULAR; INTRAVENOUS; SUBCUTANEOUS AS NEEDED
Status: DISCONTINUED | OUTPATIENT
Start: 2023-02-28 | End: 2023-02-28 | Stop reason: HOSPADM

## 2023-02-28 RX ORDER — SODIUM CHLORIDE 9 MG/ML
25 INJECTION, SOLUTION INTRAVENOUS CONTINUOUS
Status: DISCONTINUED | OUTPATIENT
Start: 2023-02-28 | End: 2023-02-28 | Stop reason: HOSPADM

## 2023-02-28 RX ORDER — ONDANSETRON 2 MG/ML
4 INJECTION INTRAMUSCULAR; INTRAVENOUS AS NEEDED
Status: DISCONTINUED | OUTPATIENT
Start: 2023-02-28 | End: 2023-02-28 | Stop reason: HOSPADM

## 2023-02-28 RX ORDER — MIDAZOLAM HYDROCHLORIDE 1 MG/ML
INJECTION, SOLUTION INTRAMUSCULAR; INTRAVENOUS AS NEEDED
Status: DISCONTINUED | OUTPATIENT
Start: 2023-02-28 | End: 2023-02-28 | Stop reason: HOSPADM

## 2023-02-28 RX ORDER — OXYCODONE HYDROCHLORIDE 5 MG/1
5 TABLET ORAL
Status: DISCONTINUED | OUTPATIENT
Start: 2023-02-28 | End: 2023-03-01 | Stop reason: HOSPADM

## 2023-02-28 RX ORDER — MORPHINE SULFATE 2 MG/ML
2 INJECTION, SOLUTION INTRAMUSCULAR; INTRAVENOUS
Status: DISCONTINUED | OUTPATIENT
Start: 2023-02-28 | End: 2023-02-28 | Stop reason: HOSPADM

## 2023-02-28 RX ORDER — DIPHENHYDRAMINE HYDROCHLORIDE 50 MG/ML
12.5 INJECTION, SOLUTION INTRAMUSCULAR; INTRAVENOUS AS NEEDED
Status: DISCONTINUED | OUTPATIENT
Start: 2023-02-28 | End: 2023-02-28 | Stop reason: HOSPADM

## 2023-02-28 RX ORDER — HYDROMORPHONE HYDROCHLORIDE 1 MG/ML
1 INJECTION, SOLUTION INTRAMUSCULAR; INTRAVENOUS; SUBCUTANEOUS
Status: DISPENSED | OUTPATIENT
Start: 2023-02-28 | End: 2023-03-01

## 2023-02-28 RX ORDER — ROSUVASTATIN CALCIUM 10 MG/1
10 TABLET, COATED ORAL
Status: DISCONTINUED | OUTPATIENT
Start: 2023-02-28 | End: 2023-03-01 | Stop reason: HOSPADM

## 2023-02-28 RX ORDER — SODIUM CHLORIDE, SODIUM LACTATE, POTASSIUM CHLORIDE, CALCIUM CHLORIDE 600; 310; 30; 20 MG/100ML; MG/100ML; MG/100ML; MG/100ML
100 INJECTION, SOLUTION INTRAVENOUS CONTINUOUS
Status: DISCONTINUED | OUTPATIENT
Start: 2023-02-28 | End: 2023-02-28 | Stop reason: HOSPADM

## 2023-02-28 RX ORDER — ACETAMINOPHEN 500 MG
1000 TABLET ORAL EVERY 6 HOURS
Status: DISCONTINUED | OUTPATIENT
Start: 2023-02-28 | End: 2023-03-01 | Stop reason: HOSPADM

## 2023-02-28 RX ORDER — KETAMINE HYDROCHLORIDE 10 MG/ML
INJECTION INTRAMUSCULAR; INTRAVENOUS AS NEEDED
Status: DISCONTINUED | OUTPATIENT
Start: 2023-02-28 | End: 2023-02-28 | Stop reason: HOSPADM

## 2023-02-28 RX ORDER — FENTANYL CITRATE 50 UG/ML
INJECTION, SOLUTION INTRAMUSCULAR; INTRAVENOUS AS NEEDED
Status: DISCONTINUED | OUTPATIENT
Start: 2023-02-28 | End: 2023-02-28 | Stop reason: HOSPADM

## 2023-02-28 RX ADMIN — ACETAMINOPHEN 1000 MG: 500 TABLET ORAL at 23:43

## 2023-02-28 RX ADMIN — DEXAMETHASONE SODIUM PHOSPHATE 8 MG: 4 INJECTION, SOLUTION INTRAMUSCULAR; INTRAVENOUS at 07:40

## 2023-02-28 RX ADMIN — WATER 2 G: 1 INJECTION INTRAMUSCULAR; INTRAVENOUS; SUBCUTANEOUS at 07:45

## 2023-02-28 RX ADMIN — DEXAMETHASONE SODIUM PHOSPHATE 4 MG: 4 INJECTION, SOLUTION INTRAMUSCULAR; INTRAVENOUS at 14:07

## 2023-02-28 RX ADMIN — HYDROMORPHONE HYDROCHLORIDE 0.2 MG: 1 INJECTION, SOLUTION INTRAMUSCULAR; INTRAVENOUS; SUBCUTANEOUS at 11:50

## 2023-02-28 RX ADMIN — Medication 20 MG: at 07:50

## 2023-02-28 RX ADMIN — ONDANSETRON HYDROCHLORIDE 4 MG: 2 SOLUTION INTRAMUSCULAR; INTRAVENOUS at 12:32

## 2023-02-28 RX ADMIN — ONDANSETRON HYDROCHLORIDE 4 MG: 2 INJECTION, SOLUTION INTRAMUSCULAR; INTRAVENOUS at 10:01

## 2023-02-28 RX ADMIN — PROPOFOL 150 MG: 10 INJECTION, EMULSION INTRAVENOUS at 07:36

## 2023-02-28 RX ADMIN — LIDOCAINE HYDROCHLORIDE 100 MG: 20 INJECTION, SOLUTION EPIDURAL; INFILTRATION; INTRACAUDAL; PERINEURAL at 07:36

## 2023-02-28 RX ADMIN — ROCURONIUM BROMIDE 20 MG: 10 SOLUTION INTRAVENOUS at 08:49

## 2023-02-28 RX ADMIN — HYDROMORPHONE HYDROCHLORIDE 1 MG: 1 INJECTION, SOLUTION INTRAMUSCULAR; INTRAVENOUS; SUBCUTANEOUS at 12:58

## 2023-02-28 RX ADMIN — OXYCODONE HYDROCHLORIDE 5 MG: 5 TABLET ORAL at 15:43

## 2023-02-28 RX ADMIN — OXYCODONE HYDROCHLORIDE 5 MG: 5 TABLET ORAL at 23:43

## 2023-02-28 RX ADMIN — SODIUM CHLORIDE, PRESERVATIVE FREE 10 ML: 5 INJECTION INTRAVENOUS at 21:17

## 2023-02-28 RX ADMIN — SENNOSIDES AND DOCUSATE SODIUM 1 TABLET: 50; 8.6 TABLET ORAL at 18:14

## 2023-02-28 RX ADMIN — PROPOFOL 50 MG: 10 INJECTION, EMULSION INTRAVENOUS at 08:03

## 2023-02-28 RX ADMIN — EPHEDRINE SULFATE 10 MG: 50 INJECTION INTRAVENOUS at 08:18

## 2023-02-28 RX ADMIN — CEFAZOLIN 2 G: 1 INJECTION, POWDER, FOR SOLUTION INTRAMUSCULAR; INTRAVENOUS at 23:48

## 2023-02-28 RX ADMIN — ACETAMINOPHEN 1000 MG: 500 TABLET ORAL at 14:07

## 2023-02-28 RX ADMIN — FENTANYL CITRATE 25 MCG: 50 INJECTION, SOLUTION INTRAMUSCULAR; INTRAVENOUS at 11:23

## 2023-02-28 RX ADMIN — FENTANYL CITRATE 25 MCG: 50 INJECTION, SOLUTION INTRAMUSCULAR; INTRAVENOUS at 11:45

## 2023-02-28 RX ADMIN — ROCURONIUM BROMIDE 5 MG: 10 SOLUTION INTRAVENOUS at 09:25

## 2023-02-28 RX ADMIN — FENTANYL CITRATE 25 MCG: 50 INJECTION, SOLUTION INTRAMUSCULAR; INTRAVENOUS at 11:17

## 2023-02-28 RX ADMIN — Medication 1 LOZENGE: at 14:06

## 2023-02-28 RX ADMIN — FENTANYL CITRATE 50 MCG: 50 INJECTION, SOLUTION INTRAMUSCULAR; INTRAVENOUS at 07:36

## 2023-02-28 RX ADMIN — ROCURONIUM BROMIDE 50 MG: 10 SOLUTION INTRAVENOUS at 07:36

## 2023-02-28 RX ADMIN — ROSUVASTATIN 10 MG: 10 TABLET, FILM COATED ORAL at 21:13

## 2023-02-28 RX ADMIN — ACETAMINOPHEN 650 MG: 325 TABLET ORAL at 06:33

## 2023-02-28 RX ADMIN — ROCURONIUM BROMIDE 10 MG: 10 SOLUTION INTRAVENOUS at 09:41

## 2023-02-28 RX ADMIN — HYDROMORPHONE HYDROCHLORIDE 0.5 MG: 1 INJECTION, SOLUTION INTRAMUSCULAR; INTRAVENOUS; SUBCUTANEOUS at 12:05

## 2023-02-28 RX ADMIN — FENTANYL CITRATE 50 MCG: 50 INJECTION, SOLUTION INTRAMUSCULAR; INTRAVENOUS at 07:30

## 2023-02-28 RX ADMIN — SODIUM CHLORIDE, POTASSIUM CHLORIDE, SODIUM LACTATE AND CALCIUM CHLORIDE 25 ML/HR: 600; 310; 30; 20 INJECTION, SOLUTION INTRAVENOUS at 06:33

## 2023-02-28 RX ADMIN — SODIUM CHLORIDE 125 ML/HR: 9 INJECTION, SOLUTION INTRAVENOUS at 18:15

## 2023-02-28 RX ADMIN — SUGAMMADEX 200 MG: 100 INJECTION, SOLUTION INTRAVENOUS at 10:01

## 2023-02-28 RX ADMIN — MEPERIDINE HYDROCHLORIDE 25 MG: 50 INJECTION INTRAMUSCULAR; INTRAVENOUS; SUBCUTANEOUS at 10:16

## 2023-02-28 RX ADMIN — MIDAZOLAM 2 MG: 1 INJECTION INTRAMUSCULAR; INTRAVENOUS at 07:25

## 2023-02-28 RX ADMIN — SODIUM CHLORIDE 125 ML/HR: 9 INJECTION, SOLUTION INTRAVENOUS at 11:23

## 2023-02-28 RX ADMIN — CEFAZOLIN 2 G: 1 INJECTION, POWDER, FOR SOLUTION INTRAMUSCULAR; INTRAVENOUS at 15:23

## 2023-02-28 RX ADMIN — FENTANYL CITRATE 25 MCG: 50 INJECTION, SOLUTION INTRAMUSCULAR; INTRAVENOUS at 11:30

## 2023-02-28 RX ADMIN — HYDROMORPHONE HYDROCHLORIDE 1 MG: 2 INJECTION, SOLUTION INTRAMUSCULAR; INTRAVENOUS; SUBCUTANEOUS at 08:07

## 2023-02-28 RX ADMIN — DEXAMETHASONE SODIUM PHOSPHATE 4 MG: 4 INJECTION, SOLUTION INTRAMUSCULAR; INTRAVENOUS at 21:13

## 2023-02-28 RX ADMIN — Medication 1 LOZENGE: at 23:43

## 2023-02-28 RX ADMIN — SENNOSIDES AND DOCUSATE SODIUM 1 TABLET: 50; 8.6 TABLET ORAL at 14:07

## 2023-02-28 RX ADMIN — OXYCODONE HYDROCHLORIDE 5 MG: 5 TABLET ORAL at 18:55

## 2023-02-28 RX ADMIN — SODIUM CHLORIDE, POTASSIUM CHLORIDE, SODIUM LACTATE AND CALCIUM CHLORIDE: 600; 310; 30; 20 INJECTION, SOLUTION INTRAVENOUS at 07:00

## 2023-02-28 RX ADMIN — ACETAMINOPHEN 1000 MG: 500 TABLET ORAL at 18:13

## 2023-02-28 NOTE — PROGRESS NOTES
Primary Nurse Vida Tello RN and Marianela RN performed a dual skin assessment on this patient No impairment noted  Urbano score is 18

## 2023-02-28 NOTE — ANESTHESIA POSTPROCEDURE EVALUATION
Post-Anesthesia Evaluation and Assessment    Patient: Florian Oliveira MRN: [id number]  SSN: [id number]    YOB: 1965  Age: 66 y.o. Sex: male      I have evaluated the patient and they are stable and ready for discharge from the PACU. Cardiovascular Function/Vital Signs  Visit Vitals  BP (!) 133/90   Pulse 87   Temp 36.9 °C (98.4 °F)   Resp 10   Ht 5' 10\" (1.778 m)   Wt 85.3 kg (188 lb)   SpO2 95%   BMI 26.98 kg/m²       Patient is status post General anesthesia for Procedure(s):  C4-5 C5-6 ANTERIOR CERVICAL DISCECTOMY WITH FUSION WITH MEDTRONIC TITAN CERVICAL CAGE. Nausea/Vomiting: None    Postoperative hydration reviewed and adequate. Pain:  Pain Scale 1: Numeric (0 - 10) (02/28/23 1123)  Pain Intensity 1: 7 (02/28/23 1123)   Managed    Neurological Status:   Neuro (WDL): Exceptions to WDL (02/28/23 1115)  Neuro  Neurologic State: Drowsy (02/28/23 1115)  LUE Motor Response: Purposeful (02/28/23 1115)  LLE Motor Response: Purposeful (02/28/23 1115)  RUE Motor Response: Purposeful (02/28/23 1115)  RLE Motor Response: Purposeful (02/28/23 1115)   At baseline    Mental Status, Level of Consciousness: Alert and  oriented to person, place, and time    Pulmonary Status:   O2 Device: Nasal cannula (02/28/23 1115)   Adequate oxygenation and airway patent    Complications related to anesthesia: None    Post-anesthesia assessment completed. No concerns    Signed By: Maria L Vogel MD     February 28, 2023              Procedure(s):  C4-5 C5-6 ANTERIOR CERVICAL DISCECTOMY WITH FUSION WITH MEDTRONIC TITAN CERVICAL CAGE. general    <BSHSIANPOST>    INITIAL Post-op Vital signs:   Vitals Value Taken Time   /90 02/28/23 1115   Temp 36.9 °C (98.4 °F) 02/28/23 1115   Pulse 92 02/28/23 1125   Resp 9 02/28/23 1125   SpO2 94 % 02/28/23 1125   Vitals shown include unvalidated device data.

## 2023-02-28 NOTE — PROGRESS NOTES
Pt seen and examined. He is doing well. Pain well-controlled. No complaints. Neuro exam WNL. Cont post op orders. Monitor.      Abel Stein, PA

## 2023-02-28 NOTE — PROGRESS NOTES
[place] [facility]patient Observation Notice (Jovita Sayarya) provided to patient/representative with verbal explanation of the notice. Time allotted for questions regarding the notice. Patient /representative provided a completed copy of the /VOON notice. Copy placed on bedside chart.

## 2023-02-28 NOTE — ANESTHESIA PREPROCEDURE EVALUATION
Relevant Problems   NEUROLOGY   (+) Dysthymia      RENAL FAILURE   (+) Nephrolith       Anesthetic History   No history of anesthetic complications            Review of Systems / Medical History  Patient summary reviewed, nursing notes reviewed and pertinent labs reviewed    Pulmonary  Within defined limits                 Neuro/Psych         Psychiatric history     Cardiovascular                  Exercise tolerance: >4 METS     GI/Hepatic/Renal  Within defined limits              Endo/Other        Arthritis     Other Findings   Comments: Gout  Cervical spine stenosis           Physical Exam    Airway  Mallampati: II  TM Distance: 4 - 6 cm  Neck ROM: normal range of motion   Mouth opening: Normal     Cardiovascular    Rhythm: regular  Rate: normal         Dental    Dentition: Upper dentition intact and Lower dentition intact     Pulmonary  Breath sounds clear to auscultation               Abdominal  GI exam deferred       Other Findings            Anesthetic Plan    ASA: 2  Anesthesia type: general          Induction: Intravenous  Anesthetic plan and risks discussed with: Patient

## 2023-02-28 NOTE — PERIOP NOTES
Patient: Kobe Guajardo MRN: [id number]  SSN: [id number]   YOB: 1965  Age: 66 y.o. Sex: male     Patient is status post Procedure(s):  C4-5 C5-6 ANTERIOR CERVICAL DISCECTOMY WITH FUSION WITH MEDTRONIC TITAN CERVICAL CAGE. Surgeon(s) and Role:     * Eva Arizmendi MD - Primary    Local/Dose/Irrigation:  no local given                  Peripheral IV 02/28/23 Left;Posterior Hand (Active)          Hemovac Anterior;Right Neck (Active)      Airway - Endotracheal Tube 02/28/23 Oral (Active)                 Dressing/Packing:  Incision 02/28/23 Neck anterior Right-Dressing/Treatment: Skin glue; Other (Comment) (Island dressing) (02/28/23 0900)    Splint/Cast:  Soft cervical collar    Other:  O2 at 4 L via nasal cannula for transport to PACU.

## 2023-02-28 NOTE — PROGRESS NOTES
Problem: Falls - Risk of  Goal: *Absence of Falls  Description: Document Mendez Pancoast Fall Risk and appropriate interventions in the flowsheet.   Outcome: Not Progressing Towards Goal  Note: Fall Risk Interventions:

## 2023-02-28 NOTE — BRIEF OP NOTE
Brief Postoperative Note    Patient: Erika Durant  YOB: 1965  MRN: [id number]    Date of Procedure: 2/28/2023     Pre-Op Diagnosis: CERVICAL STENOSIS    Post-Op Diagnosis: Same as preoperative diagnosis. Procedure(s):  C4-5 C5-6 ANTERIOR CERVICAL DISCECTOMY WITH FUSION WITH MEDTRONIC TITAN CERVICAL CAGE    Surgeon(s):  Willam Tripp MD    Surgical Assistant: Surg Asst-1: William Abbasi    Anesthesia: General     Estimated Blood Loss (mL): less than 50     Complications: None    Specimens: * No specimens in log *     Implants:   Implant Name Type Inv.  Item Serial No.  Lot No. LRB No. Used Action   GRAFT BNE SUB SM CANC FRZN MORSELIZED W/ VIABLE CELL - [id number]  GRAFT BNE SUB SM CANC FRZN MORSELIZED W/ VIABLE CELL [id number] [employer] NA N/A 1 Implanted   GRAFT BNE SUB SM CANC FRZN MORSELIZED W/ VIABLE CELL - [id number]  GRAFT BNE SUB SM CANC FRZN MORSELIZED W/ VIABLE CELL [id number] [employer] NA N/A 1 Implanted   CAGE SPNL LORDTC 6 DEG MED [id number] MM ENDOSKELETON [place] - SNA  CAGE SPNL LORDTC 6 DEG MED [id number] MM ENDOSKELETON [place] NA [employer]WD [id number] N/A 1 Implanted   PLATE SPNL LCK STRL ENDOSKELETON TCS - SNA  PLATE SPNL LCK STRL ENDOSKELETON TCS NA [employer] SOFAMOR DANEK_WD [id number] N/A 1 Implanted   CAGE SPNL LORDTC 6 DEG MED [id number] MM ENDOSKELETON [place] - SNA  CAGE SPNL LORDTC 6 DEG MED 39M25S7 MM ENDOSKELETON [place] NA [employer] SOF[employer]ANE[employer]WD [id number] N/A 1 Implanted   3.5 x 14mm screw   NA [employer] NA N/A 1 Implanted   3.5 x 16 mm screw   NA [employer] NA N/A 1 Implanted       Drains: * No LDAs found *    Findings: stenosis    Electronically Signed by Katerina Carmona MD on 2/28/2023 at 9:59 AM

## 2023-02-28 NOTE — PERIOP NOTES
TRANSFER - OUT REPORT:    Verbal report given to Indiana Holley RN(name) on Pending sale to [facility]  being transferred to [facility](unit) for routine post - op       Report consisted of patients Situation, Background, Assessment and   Recommendations(SBAR). Time Pre op antibiotic given:7:45 am Ancef  Anesthesia Stop time: 10:24    Information from the following report(s) SBAR, Kardex, OR Summary, Procedure Summary, Intake/Output, MAR, Recent Results, Med Rec Status, and Cardiac Rhythm SR  was reviewed with the receiving nurse. Opportunity for questions and clarification was provided. Is the patient on 02? NO       L/Min        Other     Is the patient on a monitor? NO    Is the nurse transporting with the patient? NO    Surgical Waiting Area notified of patient's transfer from PACU? YES      The following personal items collected during your admission accompanied patient upon transfer:   Dental Appliance: Dental Appliances: None  Vision: Visual Aid: Glasses, At home  Hearing Aid:    Jewelry: Jewelry: None  Clothing: Clothing: Footwear, Undergarments, Pants, Shirt  Other Valuables:  Other Valuables: None  Valuables sent to safe:

## 2023-03-01 VITALS
OXYGEN SATURATION: 92 % | BODY MASS INDEX: 26.92 KG/M2 | TEMPERATURE: 97.6 F | WEIGHT: 188 LBS | HEART RATE: 84 BPM | RESPIRATION RATE: 16 BRPM | HEIGHT: 70 IN | SYSTOLIC BLOOD PRESSURE: 122 MMHG | DIASTOLIC BLOOD PRESSURE: 70 MMHG

## 2023-03-01 PROCEDURE — 97161 PT EVAL LOW COMPLEX 20 MIN: CPT

## 2023-03-01 PROCEDURE — 74011250636 HC RX REV CODE- 250/636: Performed by: NEUROLOGICAL SURGERY

## 2023-03-01 PROCEDURE — 97165 OT EVAL LOW COMPLEX 30 MIN: CPT

## 2023-03-01 PROCEDURE — 97535 SELF CARE MNGMENT TRAINING: CPT

## 2023-03-01 PROCEDURE — 74011250637 HC RX REV CODE- 250/637: Performed by: NEUROLOGICAL SURGERY

## 2023-03-01 PROCEDURE — G0378 HOSPITAL OBSERVATION PER HR: HCPCS

## 2023-03-01 RX ORDER — CITALOPRAM 20 MG/1
40 TABLET, FILM COATED ORAL EVERY EVENING
Qty: 1 TABLET | Refills: 0 | Status: SHIPPED
Start: 2023-03-01

## 2023-03-01 RX ORDER — OXYCODONE HYDROCHLORIDE 5 MG/1
5 TABLET ORAL
Qty: 20 TABLET | Refills: 0 | Status: SHIPPED | OUTPATIENT
Start: 2023-03-01 | End: 2023-03-15

## 2023-03-01 RX ADMIN — HYDROMORPHONE HYDROCHLORIDE 1 MG: 1 INJECTION, SOLUTION INTRAMUSCULAR; INTRAVENOUS; SUBCUTANEOUS at 01:25

## 2023-03-01 RX ADMIN — OXYCODONE HYDROCHLORIDE 5 MG: 5 TABLET ORAL at 12:23

## 2023-03-01 RX ADMIN — OXYCODONE HYDROCHLORIDE 10 MG: 5 TABLET ORAL at 05:55

## 2023-03-01 RX ADMIN — SENNOSIDES AND DOCUSATE SODIUM 1 TABLET: 50; 8.6 TABLET ORAL at 08:35

## 2023-03-01 RX ADMIN — DEXAMETHASONE SODIUM PHOSPHATE 4 MG: 4 INJECTION, SOLUTION INTRAMUSCULAR; INTRAVENOUS at 05:55

## 2023-03-01 RX ADMIN — ACETAMINOPHEN 1000 MG: 500 TABLET ORAL at 11:19

## 2023-03-01 RX ADMIN — OXYCODONE HYDROCHLORIDE 10 MG: 5 TABLET ORAL at 08:50

## 2023-03-01 NOTE — PROGRESS NOTES
PHYSICAL THERAPY EVALUATION/DISCHARGE  Patient: Mario Stack (65 y.o. male)  Date: 3/1/2023  Primary Diagnosis: Cervical spinal stenosis [M48.02]  Procedure(s) (LRB):  C4-5 C5-6 ANTERIOR CERVICAL DISCECTOMY WITH FUSION WITH MEDTRONIC TITAN CERVICAL CAGE (N/A) 1 Day Post-Op   Precautions: BLT, cervical collar         ASSESSMENT  Based on the objective data described below, the patient presents with independent functional mobility post-op day 1 C4-6 anterior cervical discectomy with fusion. Education is provided on cervical precautions including wearing C-collar as prescribed, no lifting greater than 5lbs, use of log roll technique for supine<>sit, and no cervical rotation or flexion/extension. Patient demonstrates the ability to ambulate and transfers with independence. He ascends/descends 14 steps with L hand rail and good stability. Patient is cleared for D/C    Other factors to consider for discharge: medical stabiltiy      Further skilled acute physical therapy is not indicated at this time. PLAN :  Recommendation for discharge: (in order for the patient to meet his/her long term goals)  No skilled physical therapy/ follow up rehabilitation needs identified at this time. This discharge recommendation:  Has been made in collaboration with the attending provider and/or case management    IF patient discharges home will need the following DME: none       SUBJECTIVE:   Patient stated I'm doing alright.     OBJECTIVE DATA SUMMARY:   HISTORY:    Past Medical History:   Diagnosis Date    Agatston coronary artery calcium score less than 100 03/15/2022    score of 95, 70th percentile rank    Cervical spine fracture ([facility] [address] 09/05/2014    DDD (degenerative disc disease), cervical     Dr. Thierry Barajas. Dr. Marcelino Gtz. Injections 2019, 2021. MRI 6/2019 severe left foraminal stenosis with disc protrusion/osteophyte C4    Dysthymia 05/23/2018    [facility]ace therapy.  taking celexa since divorce 2018    Elective hair transplant for purposes other than remedying health states     Gout     History of angioedema 05/23/2018    saw allergist- testing neg for nut allergy    History of seizures as a child     childhood. 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 surgery 3/2020     Past Surgical History:   Procedure Laterality Date    HX CERVICAL FUSION  20014    c6-c7 fusion following fracture    HX COLONOSCOPY  02/15/2019    normal    HX GI      COLONOSCOPY    HX HEENT  2020    LASIK    HX REFRACTIVE SURGERY      HX UROLOGICAL  03/2020    KIDNEY STONE REMOVAL       Prior level of function: independent   Personal factors and/or comorbidities impacting plan of care: medical stabiltiy     Home Situation  Home Environment: Private residence  # Steps to Enter: 4  Rails to Enter: Yes  Hand Rails : Bilateral  One/Two Story Residence: Two story  Support Systems: Spouse/Significant Other  Patient Expects to be Discharged to[de-identified] Home  Current DME Used/Available at Home: Grab bars  Tub or Shower Type: Shower    EXAMINATION/PRESENTATION/DECISION MAKING:   Critical Behavior:  Neurologic State: Alert, Appropriate for age  Orientation Level: Oriented X4  Cognition: Appropriate for age attention/concentration, Appropriate decision making, Appropriate safety awareness, Follows commands     Hearing: Auditory  Auditory Impairment: Hard of hearing, bilateral (mild hard of hearing per pt. ringing in ears since he had Covid last year.)  Hearing Aids/Status: Does not own  Skin:    Edema:   Range Of Motion:                          Strength:                          Tone & Sensation:                                  Coordination:     Vision:   Acuity: Within Defined Limits  Functional Mobility:  Bed Mobility:              Transfers:                             Balance:      Ambulation/Gait Training: Stairs: Therapeutic Exercises:        Physical Therapy Evaluation Charge Determination   History Examination Presentation Decision-Making   LOW Complexity : Zero comorbidities / personal factors that will impact the outcome / POC LOW Complexity : 1-2 Standardized tests and measures addressing body structure, function, activity limitation and / or participation in recreation  LOW Complexity : Stable, uncomplicated  LOW Complexity : FOTO score of       Based on the above components, the patient evaluation is determined to be of the following complexity level: LOW     Pain Rating:      Activity Tolerance:   Good      After treatment patient left in no apparent distress:   Sitting in chair, Call bell within reach, and Caregiver / family present    COMMUNICATION/EDUCATION:   The patients plan of care was discussed with: Occupational therapist and Registered nurse. Fall prevention education was provided and the patient/caregiver indicated understanding., Patient/family have participated as able in goal setting and plan of care. , and Patient/family agree to work toward stated goals and plan of care.     Thank you for this referral.  Mira Bryant, PT   Time Calculation: 11 mins

## 2023-03-01 NOTE — PROGRESS NOTES
Spine Surgery Progress Note  Barndi Thacker PA-C    Admit Date: 2023   LOS: 0 days        Daily Progress Note: 3/1/2023    POD:1 Day Post-Op    S/P: Procedure(s):  C4-5 C5-6 ANTERIOR CERVICAL DISCECTOMY WITH FUSION WITH MEDTRONIC TITAN CERVICAL CAGE    Subjective:     Pt is doing well on POD#1. He has no complaints. Ambulating, swallowing, and voiding without issue. Pt denies numbness, tingling, chest pain, leg pain, nausea, vomiting, difficulty swallowing, headache, and dyspnea. Pt resting comfortably in bed. Objective:     Vital signs  VSS Afebrile. Temp (24hrs), Av.3 °F (36.8 °C), Min:97.6 °F (36.4 °C), Max:99.1 °F (37.3 °C)   701 -  190  In: 120 [P.O.:120]  Out: 620 [Urine:620]  1901 -  0700  In: 6859 [I.V.:2462]  Out: 50     Visit Vitals  /70 (BP 1 Location: Right upper arm)   Pulse 84   Temp 97.6 °F (36.4 °C)   Resp 16   Ht 5' 10\" (1.778 m)   Wt 85.3 kg (188 lb)   SpO2 92%   BMI 26.98 kg/m²    O2 Flow Rate (L/min): 2 l/min O2 Device: None (Room air)       Output (mL)  Urine Voided: 620 ml (23 0854)  Last Bowel Movement Date: 23 (23 1626)  Unmeasurable Output  Urine Occurrence(s): 1 (23 7708)     Pain control  Pain Assessment  Pain Scale 1: Numeric (0 - 10)  Pain Intensity 1: 3  Pain Onset 1: post opt  Pain Location 1: Incisional  Pain Orientation 1: Anterior  Pain Description 1: Aching  Pain Intervention(s) 1: Rest    PT/OT  Gait              Physical Exam:  Gen: No acute distress. Neuro: A&Ox3. Follows commands. Speech clear. Affect normal.  ROMERO. Strength 5/5 in UE and LE BL. Sensation intact. Gait deferred. Calves soft and supple; No pain with passive stretch  Skin: Incision C/D/I    24 hour results:    No results found for this or any previous visit (from the past 24 hour(s)).        Assessment:     Active Problems:    Cervical spinal stenosis (2023)        Plan:     s/p C4-6 ACDF  -PT/OT - in soft collar    -Pain control - scheduled tylenol, prn oxycodone    -D/C drain   -ADAT   -Cont home meds      Readiness for discharge:     [x] Vital Signs stable    [x] + Voiding    [x] Wound intact, drainage minimal    [x] Tolerating PO intake     [x] Cleared by PT (OT if applicable)    [x] Adequate pain control on oral medication alone       Activity: up with assist  DVT ppx: SCDs  Dispo: home  today    Plan d/w Dr. Cameron Lam, PA

## 2023-03-01 NOTE — DISCHARGE INSTRUCTIONS
After Hospital Care Plan:  Discharge Instructions Cervical (Neck) Spine Surgery Dr. Marleny Abdi    Patient Name: Morris Roche    Date of procedure: 2/28/2023  Date of discharge: 3/1/2023      Follow up appointments  -follow up with Dr. Marleny Abdi in 2 weeks. [phone] to make an appointment as soon as you get home from the hospital.    When to call your Spine Surgeon:  -Difficulty swallowing that is worse than when you left the hospital.  -Signs of infection-if your incision is red; continues to have drainage; drainage has a foul odor or if you have a persistent fever over 101 degrees for 24 hours  -nausea or vomiting, severe headache  -changes in sensation in your arms or legs (numbness, tingling, loss of color)  -increased weakness-greater than before your surgery  -severe pain or pain not relieved by medications  -Signs of a blood clot in your leg-calf pain, tenderness, redness, swelling of lower leg    When to call your Primary Care Physician:  -Concerns about medical conditions such as diabetes, high blood pressure, asthma, congestive heart failure  -Call if blood sugars are elevated, persistent headache or dizziness, coughing or congestion, constipation or diarrhea, burning with urination, abnormal heart rate    When to call 911 and go to the nearest emergency room:  -acute onset of chest pain, shortness of breath, difficulty breathing    Activity  - You are going home a well person, be as active as possible. Your only exercise should be walking. Start with short frequent walks and increase your walking distance each day.  -Limit the amount of time you sit to 20-30 minute intervals. Sitting for prolonged periods of time will be uncomfortable for you following surgery.   - Do NOT lift anything over 5 pounds  -From now on, even when lifting light weight, bend with your knees and not your back.  -Do NOT do any neck exercises until you have been instructed by your doctor  -When you are in bed, you may lay on your back or on either side. Do NOT lie on your stomach    Cervical Collar (Aspen Collar)  -You are required to wear your cervical collar at all times; except when showering. You may remove the collar long enough to change the pads when needed and to change your dressing each day. -Do not bend or twist when your collar is off. It is best to have someone assist you when changing the pads and your dressing to prevent you from bending your neck. - Clean the pads on your neck collar every day by hand washing with a mild soap and water. Pat them dry with a towel and lay out to air dry. Do not use heat to dry the pads. Diet  -resume usual diet; drink plenty of fluids; eat foods high in fiber  -It is important to have regular bowel movements. Pain medications may cause constipation. You may want to take a stool softener (such as Senokot-S or Colace) to prevent constipation.  -If constipation occurs, take a laxative (such as Dulcolax tablets, Milk of Magnesia, or a suppository). Laxatives should only be used if the above preventable measures have failed and you still have not had a bowel movement after three days    Driving  -You may not drive or return to work until instructed by your physician. However, you may ride in the car for short periods of time. Incision Care  -You may take brief showers but do not run the water run directly onto the wound. After showering or bathing gently blot the wound dry with a soft towel.  -Do not rub or apply any lotions or ointments to your incision site.   -Do not soak or scrub your wound; do not swim until the adhesive film has fallen off naturally  -Do not scratch, rub, or pick at the wound or skin glue, doing so may loosen the film before the wound is fully healed  -Stay out of direct sunlight and do not use tanning lamps     Showering  -You may shower in approximately 4 days after your surgery.    -Reminder- Make sure you put clean pads on your collar after your shower. -Do not take a tub bath. Preventing blood clots  -You have been given T.E.D. stockings to wear. Continue to wear these for 7 days after your discharge. Put them on in the morning and take them off at night.    -They are used to increase your circulation and prevent blood clots from forming in your legs  -T. E.D. stockings can be machine washed, temperature not to exceed 160° F (71°C) and machine dried for 15 to 20 minutes, temperature not to exceed 250° F (121°C). Pain management  -Take pain medication as prescribed; decrease the amount you use as your pain lessens.  -Do not wait until you are in extreme pain to take your medication.  -Avoid alcoholic beverages while taking pain medication. Pain Medication Safety  DO:  -Read the Medication Guide   -Take your medicine exactly as prescribed   -Store your medicine away from children and in a safe place   -Flush unused medicine down the toilet   -Call your healthcare provider for medical advice about side effects. You may report side effects to FDA at 1-422-FDA-9185.   -Please be aware that many medications contain Tylenol. We do not want you to over medicate so please read the information below as a guide. Do not take more than 4 Grams of Tylenol in a 24 hour period. (There are 1000 milligrams in one Gram)                                                                                                                                                                                                                                                                                                                                                                  Percocet contains 325 mg of Tylenol per tablet (do not take more than 12 tablets in 24 hours)  Lortab contains 500 mg of Tylenol per tablet (do not take more than 8 tablets in 24 hours)  Norco contains 325 mg of Tylenol per tablet (do not take more than 12 tablets in 24 hours).   DO NOT:  -Do not give your medicine to others   -Do not take medicine unless it was prescribed for you   -Do not stop taking your medicine without talking to your healthcare provider   -Do not break, chew, crush, dissolve, or inject your medicine. If you cannot  swallow your medicine whole, talk to your healthcare provider.  -Do not drink alcohol while taking this medicine  -Do not take anti-inflammatory medications or aspirin unless instructed by your   physician.

## 2023-03-01 NOTE — PROGRESS NOTES
Problem: Falls - Risk of  Goal: *Absence of Falls  Description: Document Jolayne Levels Fall Risk and appropriate interventions in the flowsheet.   Outcome: Progressing Towards Goal  Note: Fall Risk Interventions:

## 2023-03-01 NOTE — PROGRESS NOTES
Bedside shift change report given to [address] [place] [address] (oncoming nurse) by Kavon Christian RN (offgoing nurse). Report included the following information SBAR, Kardex, OR Summary, Intake/Output, MAR, and Recent Results.

## 2023-03-01 NOTE — PROGRESS NOTES
Problem: Falls - Risk of  Goal: *Absence of Falls  Description: Document Lenord Corners Fall Risk and appropriate interventions in the flowsheet. Outcome: Progressing Towards Goal  Note: Fall Risk Interventions:                                Problem: Pressure Injury - Risk of  Goal: *Prevention of pressure injury  Description: Document Urbano Scale and appropriate interventions in the flowsheet. Outcome: Progressing Towards Goal  Note: Pressure Injury Interventions:  Sensory Interventions: Discuss PT/OT consult with provider, Keep linens dry and wrinkle-free, Turn and reposition approx. every two hours (pillows and wedges if needed)    Moisture Interventions: Offer toileting Q_hr, Moisture barrier, Minimize layers    Activity Interventions: PT/OT evaluation, Increase time out of bed    Mobility Interventions: PT/OT evaluation, HOB 30 degrees or less, Turn and reposition approx.  every two hours(pillow and wedges)    Nutrition Interventions: Document food/fluid/supplement intake                     Problem: Complex Spine Procedure:  Day of Surgery  Goal: Activity/Safety  Outcome: Progressing Towards Goal  Goal: Diagnostic Test/Procedures  Outcome: Progressing Towards Goal  Goal: Nutrition/Diet  Outcome: Progressing Towards Goal  Goal: Medications  Outcome: Progressing Towards Goal  Goal: Respiratory  Outcome: Progressing Towards Goal     Problem: Pain  Goal: *Control of Pain  Outcome: Progressing Towards Goal

## 2023-03-01 NOTE — PROGRESS NOTES
OCCUPATIONAL THERAPY EVALUATION/DISCHARGE  Patient: Lissy Hernandez (24 y.o. male)  Date: 3/1/2023  Primary Diagnosis: Cervical spinal stenosis [M48.02]  Procedure(s) (LRB):  C4-5 C5-6 ANTERIOR CERVICAL DISCECTOMY WITH FUSION WITH MEDTRONIC TITAN CERVICAL CAGE (N/A) 1 Day Post-Op   Precautions: Fall, cervical       ASSESSMENT  Based on the objective data described below, the patient presents with ability to complete ADL tasks at [facility] from C4-5 and C5-6 ACDF with fusion. Nursing cleared for therapy. Received in bed in soft collar, agreeable to therapy. He lives at home with his wife and was indep PTA. He works in IT and uses a standing desk. Provided education on home safety, cervical precautions, and ADL compensatory techniques with good understanding. Following education, patient completed dressing at [facility]     Current Level of Function (ADLs/self-care): indep-mod indep    Functional Outcome Measure: The patient scored 24/24 on the Barthel Index outcome measure which is indicative of none. Other factors to consider for discharge: Patient with hx of cervical surgery with fair understanding with cervical precautions. PLAN :  Recommendation for discharge: (in order for the patient to meet his/her long term goals)  No skilled occupational therapy/ follow up rehabilitation needs identified at this time. This discharge recommendation:  Has been made in collaboration with the attending provider and/or case management    IF patient discharges home will need the following DME: none       SUBJECTIVE:   Patient stated I have a standing desk at work.     OBJECTIVE DATA SUMMARY:   HISTORY:   Past Medical History:   Diagnosis Date    Agatston coronary artery calcium score less than 100 03/15/2022    score of 95, 70th percentile rank    Cervical spine fracture ([facility] [address] 09/05/2014    DDD (degenerative disc disease), cervical     Dr. Nathan Green. Dr. Rita Yepez. Injections 2019, 2021.   MRI 6/2019 severe left foraminal stenosis with disc protrusion/osteophyte C4    Dysthymia 05/23/2018    Grace therapy. taking celexa since divorce 2018    Elective hair transplant for purposes other than remedying health states     Gout     History of angioedema 05/23/2018    saw allergist- testing neg for nut allergy    History of seizures as a child     childhood. 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 surgery 3/2020     Past Surgical History:   Procedure Laterality Date    HX CERVICAL FUSION  20014    c6-c7 fusion following fracture    HX COLONOSCOPY  02/15/2019    normal    HX GI      COLONOSCOPY    HX HEENT  2020    LASIK    HX REFRACTIVE SURGERY      HX UROLOGICAL  03/2020    KIDNEY STONE REMOVAL       Prior Level of Function/Environment/Context: Home with wife. Indep with ADL tasks. Expanded or extensive additional review of patient history:     Home Situation  Home Environment: Private residence  # Steps to Enter: 4  Rails to Enter: Yes  Hand Rails : Bilateral  One/Two Story Residence: Two story  Living Alone: No  Support Systems: Spouse/Significant Other  Patient Expects to be Discharged to[de-identified] Home  Current DME Used/Available at Home: Grab bars  Tub or Shower Type: Shower    Hand dominance: Right    EXAMINATION OF PERFORMANCE DEFICITS:  Cognitive/Behavioral Status:  Neurologic State: Alert; Appropriate for age  Orientation Level: Oriented X4  Cognition: Appropriate for age attention/concentration; Appropriate decision making; Appropriate safety awareness; Follows commands          Hearing:   Auditory  Auditory Impairment: Hard of hearing, bilateral (mild hard of hearing per pt. ringing in ears since he had Covid last year.)  Hearing Aids/Status: Does not own    Vision/Perceptual:                           Acuity: Within Defined Limits         Range of Motion:  WDL                   Strength:  WDL                Coordination:     Fine Motor Skills-Upper: Left Intact; Right Intact    Gross Motor Skills-Upper: Left Intact; Right Intact    Tone & Sensation:    WDL             Balance:  Sitting: Intact  Standing: Intact    Functional Mobility and Transfers for ADLs:  Bed Mobility:  Rolling: Independent  Supine to Sit: Independent    Transfers:  Sit to Stand: Independent  Stand to Sit: Independent  Bed to Chair: Independent  Bathroom Mobility: Independent  Toilet Transfer : Independent    ADL Assessment:  Feeding: Independent    Oral Facial Hygiene/Grooming: Independent    Bathing: Modified independent    Type of Bath: Patient refused    Upper Body Dressing: Independent    Lower Body Dressing: Modified independent    Toileting: Modified independent                ADL Intervention and task modifications:  Patient recalled and demonstrated 3/3 cervical spine precautions with minimal cues for twisting. Patient instructed and indicated understanding the benefits of maintaining activity tolerance, functional mobility, and independence with self-care tasks during acute stay  to ensure safe return home and to baseline. Encouraged patient to increase frequency and duration OOB, not sitting longer than 30 mins without marching/walking with staff, be out of bed for all meals, perform daily ADLs (as approved by RN/MD regarding bathing etc.), and performing functional mobility to/from bathroom. Patient instruction and indicated understanding on body mechanics, ergonomics and gravitational force on the spine during different body positions to plan activities in prep for return home to complete basic ADLs, instrumental ADLs and back to work safely. Bathing: Patient instructed and indicated understanding when bathing to not submerge wound in water, stand to shower or sponge bathe, cover wound with plastic and tape to ensure no water reaches bandage/wound without cues.     Dressing brace: Patient instructed and demonstrated while in front of mirror to don/doff Velcro on brace using dominant side, keeping non-dominant side intact. Instruction and indicated understanding in removal of fabric pieces, placement of clean pieces, don brace, then can hand wash and allow air dry. Dressing lower body: Patient instructed to don brace first and on the benefits to remain seated to don all clothing to increase independence with precautions and pain management. Patient instructed and demonstrated tailor sitting for lower body dressing with Modified independent. Toileting: Patient instructed on the benefits of using flushable wet wipes and toilet tongs if decreased reach or pain for fátima care. Also, the benefits of a reacher to aid in clothing management. Home safety: Patient instructed and indicated understanding on home modifications and safety [raise height of ADL objects (i.e. clothing, sink items, fridge items, items to mouth when grooming), change of floor surfaces, clear pathways] to increase independence and fall prevention. Standing: Patient instructed and indicated understanding to walk up to sink/countertop/surfaces, step into walker, square off while using objects, slide objects along surfaces, to increase adherence to back precautions and fall prevention. Tub transfer: Patient instructed and indicated understanding regarding when it is safe to begin transfer into tub (complete stairs with PT, advance exercises with PT high enough to clear tub height, and while clothes donned practice with another person present). Functional Measure:  [facility] AM-PAC®      Daily Activity Inpatient Short Form (6-Clicks) Version 2  How much HELP from another person do you currently need. .. (If the patient hasn't done an activity recently, how much help from another person do you think they would need if they tried?) Total A Lot A Little None   1.   Putting on and taking off regular lower body clothing? []   1 []   2 []   3 [x]   4   2. Bathing (including washing, rinsing, drying)? []   1 []   2 []   3 [x]   4   3. Toileting, which includes using toilet, bedpan, or urinal? []   1 []   2 []   3 [x]   4   4. Putting on and taking off regular upper body clothing? []   1 []   2 []   3 [x]   4   5. Taking care of personal grooming such as brushing teeth? []   1 []   2 []   3 [x]   4   6. Eating meals? []   1 []   2 []   3 [x]   4     Raw Score: 24/24                            Cutoff score ?191,2,3 had higher odds of discharging home with home health or need of SNF/IPR    1. Diego Harden. Validity of the AM-PAC 6-Clicks Inpatient Daily Activity and Basic Mobility Short Forms. Physical Therapy Mar 2014, [phone]; DOI: 10.2522/ptj.[id number]  2. Adina Oseguera. Association of AM-PAC \"6-Clicks\" Basic Mobility and Daily Activity Scores With Discharge Destination. Phys Ther. 2021 Apr 4;101(4):oskb272. doi: 10.1093/ptj/otpi048. PMID: 93916410. V Allie Zheng, Scott D, Clary Benton, Janette K, Shaoib S. Activity Measure for Post-Acute Care \"6-Clicks\" Basic Mobility Scores Predict Discharge Destination After Acute Care Hospitalization in Select Patient Groups: A Retrospective, Observational Study. Arch Rehabil Res Clin Transl. 2022 Jul 16;4(3):301536. doi: 10.1016/j.arrct. 2351.321183. PMID: 86547876; PMCID: [id number]. 4. Cielo Garcia, Anabelle MARTINEZ. AM-PAC Short Forms Manual 4.0. Revised 2/2020.      Occupational Therapy Evaluation Charge Determination   History Examination Decision-Making   LOW Complexity : Brief history review  LOW Complexity : 1-3 performance deficits relating to physical, cognitive , or psychosocial skils that result in activity limitations and / or participation restrictions  LOW Complexity : No comorbidities that affect functional and no verbal or physical assistance needed to complete eval tasks       Based on the above components, the patient evaluation is determined to be of the following complexity level: LOW   Pain Ratin/10 posterior cervical     Activity Tolerance:   Good    After treatment patient left in no apparent distress:    Sitting in chair and Call bell within reach    COMMUNICATION/EDUCATION:   The patients plan of care was discussed with: Physical therapist and Registered nurse.      Thank you for this referral.  Angle Corrales, OT  Time Calculation: 20 mins

## 2023-03-04 NOTE — OP NOTES
[address] [facility] [place]  OPERATIVE REPORT    Name:  Lupe Chacon  MR#:  [id number]  :  1965  ACCOUNT #:  [de-identified]  DATE OF SERVICE:  2023    PREOPERATIVE DIAGNOSES:  C4-5, C5-6 spondylosis with left-sided radiculopathy, stenosis previous C6-7 fusion. POSTOPERATIVE DIAGNOSES:  C4-5, C5-6 spondylosis with left-sided radiculopathy, stenosis previous C6-7 fusion. PROCEDURE PERFORMED:  Anterior C4-5, C5-6 cervical diskectomy with instrumented fusion C4-C6 using Medtronic Titan Standalone cervical cage at C5-6, tightened cervical cage at C4-5 with anterior freestanding Elite cervical plate at Z5-7, use of operating microscope. SURGEON:  Vanda Chin MD    ASSISTANT:  Mary Peres. ANESTHESIA:  General endotracheal anesthesia. COMPLICATIONS:  None. SPECIMENS REMOVED:  None. IMPLANTS:  As noted above. ESTIMATED BLOOD LOSS:  50 mL. INDICATIONS:  This is a 56-year-old gentleman, who is number of years out of a C6-7 ACDF, progressive left-sided arm and shoulder pain. Workup revealed significant disc osteophyte C4-5 greater than C5-6 at these levels. After failing all nonoperative therapy, informed consent was obtained. PROCEDURE:  The patient was taken to the operating room and placed under general endotracheal anesthesia. All necessary lines and monitors were placed. Given appropriate dose of IV antibiotics. SCDs were placed. He was placed supine on the operating table, arms tucked by the side. Shoulders taped down. All pressure points were padded. The anterior cervical region was prepped and draped in standard sterile fashion. Incision was made from the midline to the right sternocleidomastoid with a skin knife and carried down with Bovie electrocautery, subcutaneous tissue. Platysma was undermined rostrally and caudally. Plane medial to the sternocleidomastoid was identified.   Using sharp and blunt dissection through some scar tissue, the prevertebral space was cleaned off. Trachea and esophagus tracked medially, carotid sheath laterally. I dissected out the anterior portion of the C6-7 plate. C4-C5 and rostral C6 were cleaned off of soft tissue and the longus colli were undermined. Localizing x-ray was obtained. Self-retaining retractors placed over C5-6. Distraction pins were placed at this level. Distraction was placed at disc space. The operating microscope was brought into field. Under the microscope, complete diskectomy was performed at C5-6 widely decompressing the entire thecal sac. The posterior longitudinal ligament was opened down to the dura and a wide decompression of the dura was performed. A generous foraminotomy of the left C6 nerve root was performed. There was a very significant disc osteophyte at this level and the left nerve root was skeletonized. The endplates were decorticated. I then placed a Medtronic Titan structural allograft packed with Lilian allograft in the space and then was able to place 14 mm screws, one rostrally and one caudally with good purchase of the device. I then adjusted my retractors and distraction pins to C4-5 under the microscope. At this level, a complete diskectomy was performed. At this level, there was severe foraminal and left lateral recess stenosis from primarily osteophyte. This was widely decompressed. The dura was skeletonized from foramen to foramen and a wide decompression of the left C5 nerve root was performed under the microscope. The endplates were decorticated. Another Medtronic Titan cage packed with Lilian allograft was performed. At this level, due to the body habitus and the angle, I was not able to get a significant any fixation screws into C5 to point caudally. I was hung up on the incision and scar tissue.   Therefore, it was decided to not place screws at this level and instead I placed an anterior freestanding Medtronic Elite plate with 72-YH screws, two screws in C4 and two screws in C5 with good purchase. Locking mechanisms were engaged. Fluoroscopy showed good position of the implants. The retractors were removed. The wounds were irrigated. Hemostasis was achieved. Hemovac drain was placed, brought through a separate stab incision. The platysma was closed with inverted 2-0 Vicryl and running 4-0 Monocryl in a subcuticular fashion. Wounds were cleaned, dried, and dressed with sterile dressing. The patient was then extubated and taken to recovery room in stable condition.       MD APPLE [id number]  D:  03/04/2023 10:42  T:  03/04/2023 13:58  JOB #:  [id number]  CC:  Gurwinder Freitas MD

## 2023-03-15 RX ORDER — ROSUVASTATIN CALCIUM 10 MG/1
TABLET, COATED ORAL
Qty: 90 TABLET | Refills: 1 | Status: SHIPPED | OUTPATIENT
Start: 2023-03-15

## 2023-03-30 ENCOUNTER — OFFICE VISIT (OUTPATIENT)
Dept: INTERNAL MEDICINE CLINIC | Age: 58
End: 2023-03-30

## 2023-03-30 VITALS
SYSTOLIC BLOOD PRESSURE: 126 MMHG | HEART RATE: 71 BPM | OXYGEN SATURATION: 94 % | DIASTOLIC BLOOD PRESSURE: 76 MMHG | RESPIRATION RATE: 17 BRPM | WEIGHT: 198 LBS | BODY MASS INDEX: 28.35 KG/M2 | HEIGHT: 70 IN | TEMPERATURE: 98.1 F

## 2023-03-30 DIAGNOSIS — R53.83 FATIGUE, UNSPECIFIED TYPE: ICD-10-CM

## 2023-03-30 DIAGNOSIS — Z12.5 SCREENING PSA (PROSTATE SPECIFIC ANTIGEN): ICD-10-CM

## 2023-03-30 DIAGNOSIS — Z00.00 PREVENTATIVE HEALTH CARE: Primary | ICD-10-CM

## 2023-03-30 DIAGNOSIS — M10.9 GOUT, UNSPECIFIED CAUSE, UNSPECIFIED CHRONICITY, UNSPECIFIED SITE: ICD-10-CM

## 2023-03-30 DIAGNOSIS — M50.30 DDD (DEGENERATIVE DISC DISEASE), CERVICAL: ICD-10-CM

## 2023-03-30 DIAGNOSIS — E78.00 PURE HYPERCHOLESTEROLEMIA: ICD-10-CM

## 2023-03-30 DIAGNOSIS — E66.3 OVERWEIGHT (BMI 25.0-29.9): ICD-10-CM

## 2023-03-30 DIAGNOSIS — F34.1 DYSTHYMIA: ICD-10-CM

## 2023-03-30 RX ORDER — PHENTERMINE HYDROCHLORIDE 37.5 MG/1
TABLET ORAL
Qty: 30 TABLET | Refills: 2 | Status: SHIPPED | OUTPATIENT
Start: 2023-03-30

## 2023-03-30 RX ORDER — OXYCODONE AND ACETAMINOPHEN 5; 325 MG/1; MG/1
TABLET ORAL
COMMUNITY
Start: 2023-03-17

## 2023-03-30 RX ORDER — CITALOPRAM 40 MG/1
40 TABLET, FILM COATED ORAL EVERY EVENING
Qty: 90 TABLET | Refills: 1
Start: 2023-03-30

## 2023-03-30 RX ORDER — PHENTERMINE HYDROCHLORIDE 37.5 MG/1
TABLET ORAL
Qty: 30 TABLET | Refills: 2 | Status: SHIPPED | OUTPATIENT
Start: 2023-03-30 | End: 2023-03-30 | Stop reason: SDUPTHER

## 2023-03-30 RX ORDER — ZOLPIDEM TARTRATE 5 MG/1
TABLET ORAL
COMMUNITY
Start: 2023-03-27

## 2023-03-30 NOTE — PROGRESS NOTES
Alice Burks is a 66 y.o. male  Presenting for his annual checkup and health maintenance review and follow-up    Works for [employer]. Works from home. Re- to Mimi Britt 11/2021. She has 3 kids  He has 1 son (graduating [employer] 5/2023)  Enjoys tennis, when possible. S/p spinal surgery 2/82/23 w Dr. Kodi Linares. For chronic neck and left trapezius / shoulder pain  Anterior C4-5, C5-6 cervical diskectomy with instrumented fusion C4-C6 using Medtronic Titan Standalone cervical cage at C5-6, tightened cervical cage at C4-5 with anterior freestanding Elite cervical plate at X3-1, use of operating microscope. Reports pain is still mild to moderate, to both sides of neck. No weakness or radiation. Taking prn percocet or ambien. Status post PRP injections of scalp for hair growth and with [place] in December. .  Itching of legs and left inguinal region. Seeing derm. Dysthymia  Seeing Akash Gamboa NP at [facility]. wellbutrin 2/2023 due to no effects. May have helped energy. Cravings improved. Taking celexa 40 mg  Recently added Ambien per psychiatry. Weight gain. Was in weight loss program.  Took Plenity w some bloating. Sweet tooth some since surgery, but gained weight prior  Asks about other meds for weight loss. Celi Buck  Wt Readings from Last 3 Encounters:   03/30/23 198 lb (89.8 kg)   02/28/23 188 lb (85.3 kg)   02/21/23 190 lb 11.2 oz (86.5 kg)     Hyperlipidemia  Currently he takes crestor 10 mg  ROS: taking medications as instructed, no medication side effects noted  No new myalgias, no joint pains, no weakness  No TIA's, no chest pain on exertion, no dyspnea on exertion, no swelling of ankles.    Lab Results   Component Value Date/Time    Cholesterol, total 168 11/22/2021 09:03 AM    HDL Cholesterol 37 (L) 11/22/2021 09:03 AM    LDL, calculated 93 11/22/2021 09:03 AM    LDL, calculated 120 (H) 06/16/2018 08:39 AM    VLDL, calculated 38 11/22/2021 09:03 AM    VLDL, calculated 24 06/16/2018 08:39 AM    Triglyceride 221 (H) 11/22/2021 09:03 AM     Gout follow-up  Past gout history: acute gouty arthritis. Current gout treatment: allopurinol (Zyloprim). Side effects of current therapy: none. Progress since last visit:  no acute gout attacks since last clinic visit. .  The patient is attempting to avoid high purine foods and alcohol.    Lab Results   Component Value Date/Time    Uric acid 5.2 11/22/2021 09:03 AM       Exercise: very active  Diet: generally follows a low fat low cholesterol diet  Health Maintenance   Topic Date Due    Shingles Vaccine (2 of 2) 05/10/2022    Flu Vaccine (1) 08/01/2022    Lipid Screen  11/22/2022    Depression Monitoring  08/23/2023    Colorectal Cancer Screening Combo  02/15/2029    DTaP/Tdap/Td series (2 - Td or Tdap) 11/19/2031    Hepatitis C Screening  Completed    COVID-19 Vaccine  Completed    Pneumococcal 0-64 years  Aged Out     Health Maintenance reviewed  Last digital rectal exam:  none  Lab Results   Component Value Date/Time    Prostate Specific Ag 0.6 11/22/2021 09:03 AM    Prostate Specific Ag 0.2 06/16/2018 08:39 AM    Prostate Specific Ag 0.2 07/01/2016 09:36 AM       Vaccinations reviewed  Immunization History   Administered Date(s) Administered    COVID-19, PFIZER Bivalent BOOSTER, (age 12y+), IM, 30 mcg/0.3 mL dose 12/29/2022    COVID-19, PFIZER PURPLE top, DILUTE for use, (age 15 y+), IM, 30mcg/0.3mL 03/05/2021, 03/26/2021, 11/26/2021, 06/11/2022    Influenza Vaccine 11/16/2015, 09/21/2016, 01/29/2018, 01/29/2018, 11/29/2022    Influenza, FLUARIX, FLULAVAL, FLUZONE (age 10 mo+) AND AFLURIA, (age 1 y+), PF, 0.5mL 11/21/2019, 11/19/2021    Influenza, FLUCELVAX, (age 10 mo+), MDCK, PF 10/24/2018    Tdap 11/19/2021    Zoster Recombinant 03/15/2022, 11/29/2022       Past Medical History:   Diagnosis Date    Agatston coronary artery calcium score less than 100 03/15/2022    score of 95, 70th percentile rank    Cervical spine fracture ([facility] 09/05/2014    DDD (degenerative disc disease), cervical     Dr. Diana Anderson. Dr. Roberto Justin. Injections 2019, 2021. MRI 6/2019 severe left foraminal stenosis with disc protrusion/osteophyte C4    Dysthymia 05/23/2018    [facility] therapy. taking celexa since divorce 2018    Elective hair transplant for purposes other than remedying health states     Gout     History of angioedema 05/23/2018    saw allergist- testing neg for nut allergy    History of seizures as a child     childhood. 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 surgery 3/2020      has a past surgical history that includes hx urological (03/2020); hx cervical fusion (20014); hx refractive surgery; hx colonoscopy (02/15/2019); hx gi; hx heent (2020); and hx other surgical (02/28/2023). Patient has no known allergies. Current Outpatient Medications   Medication Sig    zolpidem (AMBIEN) 5 mg tablet     oxyCODONE-acetaminophen (PERCOCET) 5-325 mg per tablet TAKE 1 TO 2 TABLETS BY MOUTH EVERY SIX HOURS, AS NEEDED    citalopram (CELEXA) 40 mg tablet Take 1 Tablet by mouth every evening. rosuvastatin (CRESTOR) 10 mg tablet TAKE 1 TABLET BY MOUTH EVERY DAY NIGHTLY *REPLACES SIMVASTATIN    APPLE CIDER VINEGAR PO Take 1,200 mg by mouth daily. allopurinoL (ZYLOPRIM) 300 mg tablet TAKE 1 TABLET BY MOUTH EVERY DAY (Patient taking differently: 300 mg nightly.)    finasteride (PROSCAR) 5 mg tablet TAKE 1 TABLET BY MOUTH EVERY DAY IN THE MORNING (Patient taking differently: 1 mg nightly.)    omega-3 fatty acids-vitamin e 1,000 mg cap Take 4 Caps by mouth daily. biotin 1 mg tab Take 1 Tab by mouth daily. No current facility-administered medications for this visit. SOCIAL HX:  reports that he has never smoked. He has never used smokeless tobacco. He reports that he does not currently use alcohol. He reports that he does not use drugs. FAMILY HX: family history includes Anxiety in his brother, father, mother, and sister; Breast Cancer in his mother; Other in his brother; Prostate Cancer in his father. Review of Systems - History obtained from the patient  General ROS: negative for - night sweats, weight gain or weight loss  Cardiovascular ROS: no chest pain, dyspnea on exertion, edema    Physical exam  Blood pressure 126/76, pulse 71, temperature 98.1 °F (36.7 °C), temperature source Oral, resp. rate 17, height 5' 10\" (1.778 m), weight 198 lb (89.8 kg), SpO2 94 %. Wt Readings from Last 3 Encounters:   03/30/23 198 lb (89.8 kg)   02/28/23 188 lb (85.3 kg)   02/21/23 190 lb 11.2 oz (86.5 kg)     He appears well, alert and oriented x 3, pleasant and cooperative. Vitals as noted. In neck   No rashes or significant lesions. Neck supple and free of adenopathy, or masses. No thyromegaly or carotid bruits. Cranial nerves normal. Lungs are clear to auscultation. Heart sounds are normal with no murmurs, clicks, gallops or rubs. Abdomen is soft, non- tender, with no masses or organomegaly. Extremities, peripheral pulses and reflexes are normal.   Skin is without rashes or suspicious lesions. Diagnoses and all orders for this visit:    1. Preventative health care  Recommend preventative labs. Check PSA. No overt prostate symptoms.  -     LIPID PANEL; Future  -     CBC W/O DIFF; Future  -     METABOLIC PANEL, COMPREHENSIVE; Future  -     PSA W/ REFLX FREE PSA; Future    2. Screening PSA (prostate specific antigen)  -     PSA W/ REFLX FREE PSA; Future    3. Gout, unspecified cause, unspecified chronicity, unspecified site  Asymptomatic. Taking allopurinol. Check CBC, renal function, uric acid level. -     URIC ACID; Future    4. Pure hypercholesterolemia  Unclear control. Check lipids. Taking Crestor 10 mg daily. Nonfasting.  -     TSH 3RD GENERATION; Future    5. Dysthymia  Managed by [facility] therapy. Currently on Celexa long-term. Trial of Wellbutrin may have helped with energy and food getting somewhat, but says did not help mood overall. Appears stable at this time. Recent stress regarding surgery. -     citalopram (CELEXA) 40 mg tablet; Take 1 Tablet by mouth every evening. 6. DDD (degenerative disc disease), cervical  Extensive recent spinal surgery. History of the same. Take a long time to recover. We will follow-up with Dr. Leidy Hernandez. Hopefully will quality of life long-term. 7. Overweight (BMI 25.0-29. 9)  He has achieved modest weight loss that previous weight loss programs and with Plenity. He would like to consider other medication. BMI below 30, but he has comorbidity of hyperlipidemia. I think he could benefit from stimulant therapy to help with mood, cravings and metabolism. Trial of phentermine. Use GoodRx if not covered by insurance. He was given a printed copy as well as prescription was sent to [facility].  Side effect discussed. Follow-up in 3 months. -     phentermine (ADIPEX-P) 37.5 mg tablet; Take 1/2 pill in AM and 1/2 pill in early afternoon  Indications: weight loss management for overweight person with bmi 27 to 29 and weight-related comorbidity  -     TSH 3RD GENERATION; Future    8. Fatigue, unspecified type  Ongoing fatigue likely secondary to dysthymia. This may also be playing a role, precipitated and exacerbated by neck pain. Check thyroid, testosterone. Taking Ambien as needed now per psychiatry.   DO NOT TAKE WITH ALCOHOL OR OXYCODONE!  -     TESTOSTERONE, TOTAL, ADULT MALE; Future    The patient is asked to make an attempt to improve diet and exercise patterns  Avoid tobacco products, excessive alcohol    Return for yearly wellness visits

## 2023-03-31 LAB
ALBUMIN SERPL-MCNC: 3.9 G/DL (ref 3.5–5)
ALBUMIN/GLOB SERPL: 1.4 (ref 1.1–2.2)
ALP SERPL-CCNC: 79 U/L (ref 45–117)
ALT SERPL-CCNC: 36 U/L (ref 12–78)
ANION GAP SERPL CALC-SCNC: 3 MMOL/L (ref 5–15)
AST SERPL-CCNC: 20 U/L (ref 15–37)
BILIRUB SERPL-MCNC: 0.2 MG/DL (ref 0.2–1)
BUN SERPL-MCNC: 17 MG/DL (ref 6–20)
BUN/CREAT SERPL: 20 (ref 12–20)
CALCIUM SERPL-MCNC: 8.8 MG/DL (ref 8.5–10.1)
CHLORIDE SERPL-SCNC: 108 MMOL/L (ref 97–108)
CHOLEST SERPL-MCNC: 127 MG/DL
CO2 SERPL-SCNC: 29 MMOL/L (ref 21–32)
COMMENT, HOLDF: NORMAL
CREAT SERPL-MCNC: 0.83 MG/DL (ref 0.7–1.3)
ERYTHROCYTE [DISTWIDTH] IN BLOOD BY AUTOMATED COUNT: 12.6 % (ref 11.5–14.5)
GLOBULIN SER CALC-MCNC: 2.8 G/DL (ref 2–4)
GLUCOSE SERPL-MCNC: 134 MG/DL (ref 65–100)
HCT VFR BLD AUTO: 43.7 % (ref 36.6–50.3)
HDLC SERPL-MCNC: 43 MG/DL
HDLC SERPL: 3 (ref 0–5)
HGB BLD-MCNC: 13.9 G/DL (ref 12.1–17)
LDLC SERPL CALC-MCNC: 61.6 MG/DL (ref 0–100)
MCH RBC QN AUTO: 28.3 PG (ref 26–34)
MCHC RBC AUTO-ENTMCNC: 31.8 G/DL (ref 30–36.5)
MCV RBC AUTO: 88.8 FL (ref 80–99)
NRBC # BLD: 0 K/UL (ref 0–0.01)
NRBC BLD-RTO: 0 PER 100 WBC
PLATELET # BLD AUTO: 250 K/UL (ref 150–400)
PMV BLD AUTO: 10.6 FL (ref 8.9–12.9)
POTASSIUM SERPL-SCNC: 4.3 MMOL/L (ref 3.5–5.1)
PROT SERPL-MCNC: 6.7 G/DL (ref 6.4–8.2)
RBC # BLD AUTO: 4.92 M/UL (ref 4.1–5.7)
SAMPLES BEING HELD,HOLD: NORMAL
SODIUM SERPL-SCNC: 140 MMOL/L (ref 136–145)
TRIGL SERPL-MCNC: 112 MG/DL (ref ?–150)
TSH SERPL DL<=0.05 MIU/L-ACNC: 1.32 UIU/ML (ref 0.36–3.74)
URATE SERPL-MCNC: 4.6 MG/DL (ref 3.5–7.2)
VLDLC SERPL CALC-MCNC: 22.4 MG/DL
WBC # BLD AUTO: 5 K/UL (ref 4.1–11.1)

## 2023-04-03 DIAGNOSIS — R73.01 IFG (IMPAIRED FASTING GLUCOSE): Primary | ICD-10-CM

## 2023-04-03 LAB
PSA SERPL-MCNC: 0.2 NG/ML (ref 0–4)
REFLEX CRITERIA: NORMAL
TESTOST SERPL-MCNC: 458 NG/DL (ref 264–916)

## 2023-04-04 ENCOUNTER — TELEPHONE (OUTPATIENT)
Dept: INTERNAL MEDICINE CLINIC | Age: 58
End: 2023-04-04

## 2023-04-04 NOTE — TELEPHONE ENCOUNTER
----- Message from Juanis Walker MD sent at 4/3/2023  8:29 PM EDT -----  Results reviewed. See MyChart Comment.    Please add on Ac if possible

## 2023-04-05 PROBLEM — R73.01 IFG (IMPAIRED FASTING GLUCOSE): Status: ACTIVE | Noted: 2023-04-01

## 2023-04-24 ENCOUNTER — TRANSCRIBE ORDER (OUTPATIENT)
Dept: SCHEDULING | Age: 58
End: 2023-04-24

## 2023-04-24 DIAGNOSIS — M54.12 CERVICAL RADICULITIS: Primary | ICD-10-CM

## 2023-04-27 ENCOUNTER — HOSPITAL ENCOUNTER (OUTPATIENT)
Dept: CT IMAGING | Age: 58
Discharge: HOME | End: 2023-04-27
Attending: NEUROLOGICAL SURGERY
Payer: COMMERCIAL

## 2023-04-27 DIAGNOSIS — M54.12 CERVICAL RADICULITIS: ICD-10-CM

## 2023-04-27 PROCEDURE — 72125 CT NECK SPINE W/O DYE: CPT

## 2023-04-30 ENCOUNTER — TRANSCRIBE ORDER (OUTPATIENT)
Dept: SCHEDULING | Age: 58
End: 2023-04-30

## 2023-04-30 DIAGNOSIS — M48.02 CERVICAL STENOSIS OF SPINE: Primary | ICD-10-CM

## 2023-05-01 ENCOUNTER — TRANSCRIBE ORDER (OUTPATIENT)
Dept: SCHEDULING | Age: 58
End: 2023-05-01

## 2023-05-03 ENCOUNTER — HOSPITAL ENCOUNTER (OUTPATIENT)
Dept: MRI IMAGING | Age: 58
Discharge: HOME | End: 2023-05-03
Attending: NEUROLOGICAL SURGERY
Payer: COMMERCIAL

## 2023-05-03 DIAGNOSIS — M48.02 CERVICAL STENOSIS OF SPINE: ICD-10-CM

## 2023-05-03 PROCEDURE — 72141 MRI NECK SPINE W/O DYE: CPT

## 2023-07-24 ENCOUNTER — TELEPHONE (OUTPATIENT)
Age: 58
End: 2023-07-24

## 2023-07-24 NOTE — TELEPHONE ENCOUNTER
Spoke with patient and he reports that several weeks ago when he returned from [facility]  he had been having sinus issues and went to  [facility] urgent care and was prescribed an antibiotic but does not recall which one. He reports that he had no improvement. His current symptoms are bilat ear ache, low grade temp initially which has subsided, sore throat-improved, no body aches, no nasal drainage, no vertigo. Intermittent headache- throbbing in center of forehead. Reports some blurred vision to left eye he is due for an eye exam. He was taking Nyquil and Dayquil and Advil that do seem to help. He reports he is leaving for vacation on 7/23/23. He has tested for COVID and is negative. Advised patient that it may take some time for him to recover if viral in nature. Advised to try Sudafed OTC and take routinely 2-3 days for the ear pressure. He is asking to schedule an appt for when he returns from vacation. Advised he may have to go go back to Urgent care for further eval and treatment in the meantime. He states understanding and grateful for the call. Scheduled with Dr. Antoinette Quinn on 8/8/23 at 1140 AM. He states he will call and cancel if feeling better. Grateful for the call.

## 2023-08-08 ENCOUNTER — OFFICE VISIT (OUTPATIENT)
Age: 58
End: 2023-08-08
Payer: COMMERCIAL

## 2023-08-08 ENCOUNTER — TELEPHONE (OUTPATIENT)
Age: 58
End: 2023-08-08

## 2023-08-08 VITALS
OXYGEN SATURATION: 97 % | RESPIRATION RATE: 19 BRPM | HEIGHT: 70 IN | TEMPERATURE: 98.1 F | WEIGHT: 190.4 LBS | DIASTOLIC BLOOD PRESSURE: 73 MMHG | SYSTOLIC BLOOD PRESSURE: 111 MMHG | HEART RATE: 54 BPM | BODY MASS INDEX: 27.26 KG/M2

## 2023-08-08 DIAGNOSIS — J01.90 ACUTE SINUSITIS, RECURRENCE NOT SPECIFIED, UNSPECIFIED LOCATION: Primary | ICD-10-CM

## 2023-08-08 PROCEDURE — 99213 OFFICE O/P EST LOW 20 MIN: CPT | Performed by: INTERNAL MEDICINE

## 2023-08-08 RX ORDER — CEFDINIR 300 MG/1
300 CAPSULE ORAL 2 TIMES DAILY
Qty: 20 CAPSULE | Refills: 0 | Status: SHIPPED | OUTPATIENT
Start: 2023-08-08 | End: 2023-08-18

## 2023-08-08 RX ORDER — OXYMETAZOLINE HYDROCHLORIDE 0.05 G/100ML
2 SPRAY NASAL 2 TIMES DAILY
Qty: 1 EACH | Refills: 3
Start: 2023-08-08 | End: 2023-09-07

## 2023-08-08 RX ORDER — PREDNISONE 20 MG/1
TABLET ORAL
Qty: 18 TABLET | Refills: 0 | Status: SHIPPED | OUTPATIENT
Start: 2023-08-08

## 2023-08-08 SDOH — ECONOMIC STABILITY: INCOME INSECURITY: HOW HARD IS IT FOR YOU TO PAY FOR THE VERY BASICS LIKE FOOD, HOUSING, MEDICAL CARE, AND HEATING?: NOT HARD AT ALL

## 2023-08-08 SDOH — ECONOMIC STABILITY: FOOD INSECURITY: WITHIN THE PAST 12 MONTHS, THE FOOD YOU BOUGHT JUST DIDN'T LAST AND YOU DIDN'T HAVE MONEY TO GET MORE.: NEVER TRUE

## 2023-08-08 SDOH — ECONOMIC STABILITY: HOUSING INSECURITY
IN THE LAST 12 MONTHS, WAS THERE A TIME WHEN YOU DID NOT HAVE A STEADY PLACE TO SLEEP OR SLEPT IN A SHELTER (INCLUDING NOW)?: NO

## 2023-08-08 SDOH — ECONOMIC STABILITY: FOOD INSECURITY: WITHIN THE PAST 12 MONTHS, YOU WORRIED THAT YOUR FOOD WOULD RUN OUT BEFORE YOU GOT MONEY TO BUY MORE.: NEVER TRUE

## 2023-08-08 NOTE — PROGRESS NOTES
HISTORY OF PRESENT ILLNESS    Chief Complaint   Patient presents with    URI     Cold/viral symptoms - lingering - feeling flush and lethargic - 1 month    Otalgia     Bilateral     Headache       Presents for follow-up    Reports 5-6 weeks of illness. Sick since end of June after trip to [facility].   Sister w same s/s, improved. Home COVID neg. Sore throat, ear, sinus pressure, fatigue, fever at first.    Went to [facility] and was tx with augmentin for 10 days 7/8/23 for sinusitis. Mild L eye blurry  Today, reports ear pressure, headache, feels warm (no fever), fatigue. No cough, SOB. Wt Readings from Last 3 Encounters:   08/08/23 190 lb 6.4 oz (86.4 kg)   03/30/23 198 lb (89.8 kg)   08/23/22 176 lb 6.4 oz (80 kg)     He does have a distant history of recurrent sinusitis. Review of Systems   All other systems reviewed and are negative, except as noted in HPI    Past Medical and Surgical History   has a past medical history of Agatston coronary artery calcium score less than 100, Cervical spine fracture ([address]), DDD (degenerative disc disease), cervical, Dysthymia, Elective hair transplant for purposes other than remedying health states, Gout, History of angioedema, History of seizures as a child, Hyperlipidemia, IFG (impaired fasting glucose), Lateral femoral cutaneous neuropathy, left, Male pattern baldness, and Nephrolith. has a past surgical history that includes cervical fusion (02/28/2023); Refractive surgery (2020); Colonoscopy (02/15/2019); Refractive surgery; cervical fusion (2014); Urological Surgery (03/2020); and Kidney stone removal (04/2023). reports that he has never smoked. He has never used smokeless tobacco. He reports that he does not currently use alcohol. He reports that he does not use drugs. family history includes Anxiety Disorder in his brother, father, mother, and sister; Breast Cancer in his mother; Other in his brother; Prostate Cancer in his father.     Physical

## 2023-08-08 NOTE — TELEPHONE ENCOUNTER
Spoke with patient to confirm appt and he reports he would still like to keep his appt today. Rescheduled for 1120 AM. And he concurs. Grateful for the call.

## 2023-08-08 NOTE — PROGRESS NOTES
HISTORY OF PRESENT ILLNESS    Chief Complaint   Patient presents with    URI     Cold/viral symptoms - lingering - feeling flush and lethargic - 1 month    Otalgia     Bilateral     Headache       Presents for follow-up    Reports 5-6 weeks of illness. Sick since end of June after trip to [facility].   Sister w same s/s, improved. Home COVID neg. Sore throat, ear, sinus pressure, fatigue, fever at first.    Went to [facility] and was tx with augmentin for 10 days 7/8/23 for sinusitis. Mild L eye blurry  Today, reports ear pressure, headache, feels warm (no fever), fatigue. No cough, SOB. Wt Readings from Last 3 Encounters:   08/08/23 190 lb 6.4 oz (86.4 kg)   03/30/23 198 lb (89.8 kg)   08/23/22 176 lb 6.4 oz (80 kg)     He does have a distant history of recurrent sinusitis. Review of Systems   All other systems reviewed and are negative, except as noted in HPI    Past Medical and Surgical History   has a past medical history of Agatston coronary artery calcium score less than 100, Cervical spine fracture ([address]), DDD (degenerative disc disease), cervical, Dysthymia, Elective hair transplant for purposes other than remedying health states, Gout, History of angioedema, History of seizures as a child, Hyperlipidemia, IFG (impaired fasting glucose), Lateral femoral cutaneous neuropathy, left, Male pattern baldness, and Nephrolith. has a past surgical history that includes cervical fusion (02/28/2023); Refractive surgery (2020); Colonoscopy (02/15/2019); Refractive surgery; cervical fusion (2014); Urological Surgery (03/2020); and Kidney stone removal (04/2023). reports that he has never smoked. He has never used smokeless tobacco. He reports that he does not currently use alcohol. He reports that he does not use drugs. family history includes Anxiety Disorder in his brother, father, mother, and sister; Breast Cancer in his mother; Other in his brother; Prostate Cancer in his father.     Physical

## 2023-08-31 RX ORDER — PREDNISONE 20 MG/1
TABLET ORAL
Qty: 18 TABLET | Refills: 0 | Status: SHIPPED | OUTPATIENT
Start: 2023-08-31

## 2023-09-06 ENCOUNTER — OFFICE VISIT (OUTPATIENT)
Age: 58
End: 2023-09-06
Payer: COMMERCIAL

## 2023-09-06 VITALS
OXYGEN SATURATION: 95 % | DIASTOLIC BLOOD PRESSURE: 78 MMHG | TEMPERATURE: 98.2 F | SYSTOLIC BLOOD PRESSURE: 132 MMHG | BODY MASS INDEX: 26.66 KG/M2 | HEART RATE: 65 BPM | RESPIRATION RATE: 20 BRPM | HEIGHT: 70 IN | WEIGHT: 186.2 LBS

## 2023-09-06 DIAGNOSIS — J20.9 ACUTE BRONCHITIS, UNSPECIFIED ORGANISM: Primary | ICD-10-CM

## 2023-09-06 PROCEDURE — 99213 OFFICE O/P EST LOW 20 MIN: CPT | Performed by: INTERNAL MEDICINE

## 2023-09-06 RX ORDER — GUAIFENESIN 600 MG/1
600 TABLET, EXTENDED RELEASE ORAL 2 TIMES DAILY
Qty: 30 TABLET | Refills: 0
Start: 2023-09-06 | End: 2023-09-21

## 2023-09-06 RX ORDER — BUDESONIDE AND FORMOTEROL FUMARATE DIHYDRATE 160; 4.5 UG/1; UG/1
2 AEROSOL RESPIRATORY (INHALATION) 2 TIMES DAILY
Qty: 10.2 G | Refills: 0 | Status: SHIPPED | OUTPATIENT
Start: 2023-09-06

## 2023-09-06 RX ORDER — LEVOFLOXACIN 500 MG/1
500 TABLET, FILM COATED ORAL DAILY
Qty: 7 TABLET | Refills: 0 | Status: SHIPPED | OUTPATIENT
Start: 2023-09-06 | End: 2023-09-13

## 2023-09-06 NOTE — PROGRESS NOTES
HISTORY OF PRESENT ILLNESS    Chief Complaint   Patient presents with    URI     Ongoing respiratory issue - generally feeling unwell - 7 wks - recently had bronchitis - still experiencing sinus pressure and earaches. Presents for follow-up    Sick since end of June after trip to [facility].   Sister w same s/s, improved. Home COVID neg. Sore throat, ear, sinus pressure, fatigue, fever at first.    Went to urgent care and was tx with augmentin for 10 days 7/8/23 for sinusitis. Given cefdinir and prednisone 8/8 8/22 ENT gave him Advair and did not show any obvious infection. Was traveling 8/26/23 and was told that he may have walking pneumonia. Chest x-ray was negative. Given Berry Oliviaalayna in [facility]  Today,  reports spastic cough, worse when outside and warm. Sinus pressure, ear congestion. Fatigue. He was given another prescription of prednisone August 31, started it today. Review of Systems   All other systems reviewed and are negative, except as noted in HPI    Past Medical and Surgical History   has a past medical history of Agatston coronary artery calcium score less than 100, Cervical spine fracture ([address]), DDD (degenerative disc disease), cervical, Dysthymia, Elective hair transplant for purposes other than remedying health states, Gout, History of angioedema, History of seizures as a child, Hyperlipidemia, IFG (impaired fasting glucose), Lateral femoral cutaneous neuropathy, left, Male pattern baldness, and Nephrolith. has a past surgical history that includes cervical fusion (02/28/2023); Refractive surgery (2020); Colonoscopy (02/15/2019); Refractive surgery; cervical fusion (2014); Urological Surgery (03/2020); and Kidney stone removal (04/2023). reports that he has never smoked. He has never used smokeless tobacco. He reports current alcohol use of about 1.0 standard drink per week. He reports that he does not use drugs.     family history includes Anxiety Disorder in his

## 2023-09-13 DIAGNOSIS — J20.9 ACUTE BRONCHITIS, UNSPECIFIED ORGANISM: ICD-10-CM

## 2023-09-13 RX ORDER — MONTELUKAST SODIUM 10 MG/1
10 TABLET ORAL DAILY
Qty: 30 TABLET | Refills: 3 | Status: SHIPPED | OUTPATIENT
Start: 2023-09-13

## 2023-09-13 RX ORDER — LEVOFLOXACIN 500 MG/1
500 TABLET, FILM COATED ORAL DAILY
Qty: 7 TABLET | Refills: 0 | Status: SHIPPED | OUTPATIENT
Start: 2023-09-13 | End: 2023-09-20

## 2023-09-15 RX ORDER — ROSUVASTATIN CALCIUM 10 MG/1
TABLET, COATED ORAL
Qty: 90 TABLET | Refills: 3 | Status: SHIPPED | OUTPATIENT
Start: 2023-09-15

## 2023-10-02 DIAGNOSIS — J20.9 ACUTE BRONCHITIS, UNSPECIFIED ORGANISM: ICD-10-CM

## 2023-10-02 RX ORDER — BUDESONIDE AND FORMOTEROL FUMARATE DIHYDRATE 160; 4.5 UG/1; UG/1
AEROSOL RESPIRATORY (INHALATION)
Qty: 10.2 EACH | Refills: 2 | Status: SHIPPED | OUTPATIENT
Start: 2023-10-02 | End: 2023-10-09 | Stop reason: SDUPTHER

## 2023-10-09 ENCOUNTER — OFFICE VISIT (OUTPATIENT)
Age: 58
End: 2023-10-09
Payer: COMMERCIAL

## 2023-10-09 VITALS
TEMPERATURE: 97.5 F | OXYGEN SATURATION: 97 % | BODY MASS INDEX: 27.06 KG/M2 | HEART RATE: 50 BPM | DIASTOLIC BLOOD PRESSURE: 66 MMHG | WEIGHT: 189 LBS | HEIGHT: 70 IN | RESPIRATION RATE: 16 BRPM | SYSTOLIC BLOOD PRESSURE: 106 MMHG

## 2023-10-09 DIAGNOSIS — J30.9 ALLERGIC RHINITIS, UNSPECIFIED SEASONALITY, UNSPECIFIED TRIGGER: Primary | ICD-10-CM

## 2023-10-09 PROCEDURE — 99213 OFFICE O/P EST LOW 20 MIN: CPT | Performed by: NURSE PRACTITIONER

## 2023-10-09 RX ORDER — MONTELUKAST SODIUM 10 MG/1
10 TABLET ORAL DAILY
Qty: 30 TABLET | Refills: 3 | Status: SHIPPED | OUTPATIENT
Start: 2023-10-09

## 2023-10-09 RX ORDER — BENZONATATE 100 MG/1
100-200 CAPSULE ORAL 3 TIMES DAILY PRN
Qty: 21 CAPSULE | Refills: 0 | Status: SHIPPED | OUTPATIENT
Start: 2023-10-09 | End: 2023-10-16

## 2023-10-09 RX ORDER — BUDESONIDE AND FORMOTEROL FUMARATE DIHYDRATE 160; 4.5 UG/1; UG/1
AEROSOL RESPIRATORY (INHALATION)
Qty: 10.2 EACH | Refills: 2 | Status: SHIPPED | OUTPATIENT
Start: 2023-10-09

## 2023-10-09 RX ORDER — AZELASTINE 1 MG/ML
1 SPRAY, METERED NASAL 2 TIMES DAILY
Qty: 60 ML | Refills: 1 | Status: SHIPPED | OUTPATIENT
Start: 2023-10-09

## 2023-10-09 ASSESSMENT — ENCOUNTER SYMPTOMS
EYE PAIN: 0
SINUS PAIN: 0
ABDOMINAL PAIN: 0
SHORTNESS OF BREATH: 0
NAUSEA: 0
SORE THROAT: 0
DIARRHEA: 0
BACK PAIN: 0
EYE REDNESS: 0
EYES NEGATIVE: 1
CHEST TIGHTNESS: 0
COUGH: 1
RHINORRHEA: 0
VOMITING: 0
CONSTIPATION: 0
SINUS PRESSURE: 1
BLOOD IN STOOL: 0
GASTROINTESTINAL NEGATIVE: 1

## 2023-10-09 NOTE — PROGRESS NOTES
Assessment and Plan     1. Allergic rhinitis, unspecified seasonality, unspecified trigger: Most likely related to allergies. Declined testing for covid-19 and influenza. Will re-start montelukast, mode of use discussed. Continue with Symbicort. Sinus rinses, hydration and rest recommended. Return instructions given. Pt verbalized understanding.   -     montelukast (SINGULAIR) 10 MG tablet; Take 1 tablet by mouth daily, Disp-30 tablet, R-3Normal  -     benzonatate (TESSALON) 100 MG capsule; Take 1-2 capsules by mouth 3 times daily as needed for Cough, Disp-21 capsule, R-0Normal  -     azelastine (ASTELIN) 0.1 % nasal spray; 1 spray by Nasal route 2 times daily Use in each nostril as directed, Disp-60 mL, R-1Normal  -     budesonide-formoterol (SYMBICORT) 160-4.5 MCG/ACT AERO; INHALE 2 PUFFS INTO THE LUNGS TWICE A DAY, Disp-10.2 each, R-2Normal     Benefits, risks, possible drug interactions, and side effects of all new medications were reviewed with the patient. Pt verbalized understanding. An electronic signature was used to authenticate this note. Nella Cordoba, APRN - CNP  10/9/2023    Follow-up and Dispositions    Return if symptoms worsen or fail to improve. History of Present Illness   Chief Complaint     Rafael Allen is a 66 y.o. male here for had concerns including Cough (Pt presents today with sinus pressure and cough, onset a week ago. Hx of recurrent URI). Pt presents today with reports of productive cough with yellow sputum and sinus pressure, onset a week ago. Reports history of recurrent URIs, treated with antibiotic treatment. Uses Symbicort inhaler daily. Has stopped taking daily antihistamine. Denies fever, chills, fatigue. Review of Systems  Review of Systems   Constitutional: Negative. Negative for chills, fatigue, fever and unexpected weight change. HENT:  Positive for postnasal drip and sinus pressure.  Negative for congestion, ear discharge, ear pain, rhinorrhea,

## 2023-10-24 DIAGNOSIS — M10.9 GOUT, UNSPECIFIED: ICD-10-CM

## 2023-10-24 RX ORDER — ALLOPURINOL 300 MG/1
TABLET ORAL
Qty: 90 TABLET | Refills: 1 | Status: SHIPPED | OUTPATIENT
Start: 2023-10-24

## 2023-11-06 DIAGNOSIS — J30.9 ALLERGIC RHINITIS, UNSPECIFIED SEASONALITY, UNSPECIFIED TRIGGER: ICD-10-CM

## 2023-11-06 RX ORDER — BUDESONIDE AND FORMOTEROL FUMARATE DIHYDRATE 160; 4.5 UG/1; UG/1
AEROSOL RESPIRATORY (INHALATION)
Qty: 1 EACH | Refills: 0 | Status: SHIPPED | OUTPATIENT
Start: 2023-11-06

## 2023-11-08 ENCOUNTER — TELEPHONE (OUTPATIENT)
Age: 58
End: 2023-11-08

## 2023-11-08 NOTE — TELEPHONE ENCOUNTER
Fax from [facility] [employer] reports that Digna Chin is covered, not symbicort     Called and left a detailed voice message for callback 3:54 PM   Mariama Bone, CCT 11/08/23

## 2023-11-10 ENCOUNTER — TELEPHONE (OUTPATIENT)
Age: 58
End: 2023-11-10

## 2023-11-10 RX ORDER — FLUTICASONE FUROATE AND VILANTEROL TRIFENATATE 200; 25 UG/1; UG/1
1 POWDER RESPIRATORY (INHALATION) DAILY
Qty: 1 EACH | Refills: 5 | Status: SHIPPED | OUTPATIENT
Start: 2023-11-10

## 2023-11-10 NOTE — TELEPHONE ENCOUNTER
Diclofenac Sodium 75 MG Oral Tab EC 15 tablet 2 1/6/2021       [facility] SACC-AMPH ASP-DEXTROAM S 12/05/2020 11/30/2020 30 30  30     LOV 1/6/21  Has follow up appointment 4/7/21 Spoke with patient and he is in agreement with discontinuing the Symbicort and changing over to a [place] that is covered by his insurance. Grateful for the call. Dr. Leydi Romo notified.

## 2023-11-29 ENCOUNTER — TELEPHONE (OUTPATIENT)
Age: 58
End: 2023-11-29

## 2023-11-29 NOTE — TELEPHONE ENCOUNTER
Ashley villa stated that she is the patient nurse  and she is calling to extend her services. She stated that the patient has Free service that in the patient package. If the doctor want to collab with her to help the patient please call   [phone]   EXT. [phone]

## 2024-04-24 ENCOUNTER — OFFICE VISIT (OUTPATIENT)
Age: 59
End: 2024-04-24
Payer: COMMERCIAL

## 2024-04-24 VITALS
BODY MASS INDEX: 24.05 KG/M2 | HEIGHT: 70 IN | SYSTOLIC BLOOD PRESSURE: 106 MMHG | OXYGEN SATURATION: 98 % | WEIGHT: 168 LBS | TEMPERATURE: 98.1 F | DIASTOLIC BLOOD PRESSURE: 66 MMHG | RESPIRATION RATE: 16 BRPM | HEART RATE: 65 BPM

## 2024-04-24 DIAGNOSIS — R73.01 IFG (IMPAIRED FASTING GLUCOSE): ICD-10-CM

## 2024-04-24 DIAGNOSIS — T78.40XA ALLERGIC REACTION, INITIAL ENCOUNTER: Primary | ICD-10-CM

## 2024-04-24 DIAGNOSIS — E78.2 MIXED HYPERLIPIDEMIA: ICD-10-CM

## 2024-04-24 DIAGNOSIS — Z12.5 ENCOUNTER FOR SCREENING FOR MALIGNANT NEOPLASM OF PROSTATE: ICD-10-CM

## 2024-04-24 DIAGNOSIS — T78.40XA ALLERGIC REACTION, INITIAL ENCOUNTER: ICD-10-CM

## 2024-04-24 PROCEDURE — 99214 OFFICE O/P EST MOD 30 MIN: CPT | Performed by: NURSE PRACTITIONER

## 2024-04-24 RX ORDER — PREDNISONE 20 MG/1
TABLET ORAL
Qty: 18 TABLET | Refills: 0 | Status: SHIPPED | OUTPATIENT
Start: 2024-04-24

## 2024-04-24 RX ORDER — CETIRIZINE HYDROCHLORIDE 10 MG/1
10 TABLET ORAL DAILY
Qty: 30 TABLET | Refills: 0 | Status: SHIPPED | OUTPATIENT
Start: 2024-04-24 | End: 2024-05-24

## 2024-04-24 ASSESSMENT — PATIENT HEALTH QUESTIONNAIRE - PHQ9
SUM OF ALL RESPONSES TO PHQ QUESTIONS 1-9: 0
4. FEELING TIRED OR HAVING LITTLE ENERGY: NOT AT ALL
SUM OF ALL RESPONSES TO PHQ QUESTIONS 1-9: 0
9. THOUGHTS THAT YOU WOULD BE BETTER OFF DEAD, OR OF HURTING YOURSELF: NOT AT ALL
SUM OF ALL RESPONSES TO PHQ9 QUESTIONS 1 & 2: 0
1. LITTLE INTEREST OR PLEASURE IN DOING THINGS: NOT AT ALL
3. TROUBLE FALLING OR STAYING ASLEEP: NOT AT ALL
SUM OF ALL RESPONSES TO PHQ QUESTIONS 1-9: 0
SUM OF ALL RESPONSES TO PHQ QUESTIONS 1-9: 0
7. TROUBLE CONCENTRATING ON THINGS, SUCH AS READING THE NEWSPAPER OR WATCHING TELEVISION: NOT AT ALL
6. FEELING BAD ABOUT YOURSELF - OR THAT YOU ARE A FAILURE OR HAVE LET YOURSELF OR YOUR FAMILY DOWN: NOT AT ALL
8. MOVING OR SPEAKING SO SLOWLY THAT OTHER PEOPLE COULD HAVE NOTICED. OR THE OPPOSITE, BEING SO FIGETY OR RESTLESS THAT YOU HAVE BEEN MOVING AROUND A LOT MORE THAN USUAL: NOT AT ALL
2. FEELING DOWN, DEPRESSED OR HOPELESS: NOT AT ALL
5. POOR APPETITE OR OVEREATING: NOT AT ALL
10. IF YOU CHECKED OFF ANY PROBLEMS, HOW DIFFICULT HAVE THESE PROBLEMS MADE IT FOR YOU TO DO YOUR WORK, TAKE CARE OF THINGS AT HOME, OR GET ALONG WITH OTHER PEOPLE: NOT DIFFICULT AT ALL

## 2024-04-24 ASSESSMENT — ENCOUNTER SYMPTOMS
EYE REDNESS: 0
ABDOMINAL PAIN: 0
CHEST TIGHTNESS: 0
GASTROINTESTINAL NEGATIVE: 1
RESPIRATORY NEGATIVE: 1
COLOR CHANGE: 0
EYE PAIN: 0
NAUSEA: 0
SINUS PRESSURE: 0
COUGH: 0
SINUS PAIN: 0
BACK PAIN: 0
EYES NEGATIVE: 1
BLOOD IN STOOL: 0
DIARRHEA: 0
CONSTIPATION: 0
RHINORRHEA: 0
VOMITING: 0
SHORTNESS OF BREATH: 0

## 2024-04-24 NOTE — PROGRESS NOTES
Diagnosis    History of seizures as a child    DDD (degenerative disc disease), cervical    Nephrolith    ED (erectile dysfunction) of organic origin    Lateral femoral cutaneous neuropathy, left    Dysthymia    Neck fracture (HCC)    Gout    Male pattern baldness    History of angioedema    Hyperlipidemia    Agatston coronary artery calcium score less than 100    Cervical spinal stenosis    IFG (impaired fasting glucose)     Past Surgical History:   Procedure Laterality Date    CERVICAL FUSION  02/28/2023    Dr. Narvaez.  Anterior C4-5, C5-6 cervical diskectomy with instrumented fusion C4-C6 using Medtronic Titan Standalone cervical cage at C5-6, tightened cervical cage at C4-5 with anterior freestanding Elite cervical plate at C4-5, use of operating microscope.    CERVICAL FUSION  2014    c6-c7 fusion following fracture    COLONOSCOPY  02/15/2019    normal    KIDNEY STONE REMOVAL  04/2023 April and June 2023    REFRACTIVE SURGERY  2020    REFRACTIVE SURGERY      UROLOGICAL SURGERY  03/2020    KIDNEY STONE REMOVAL      Social History     Tobacco Use    Smoking status: Never    Smokeless tobacco: Never   Substance Use Topics    Alcohol use: Yes     Alcohol/week: 1.0 standard drink of alcohol     Types: 1 Glasses of wine per week      Family History   Problem Relation Age of Onset    Other Brother         Amyloidosis    Anxiety Disorder Brother     Anxiety Disorder Mother     Breast Cancer Mother     Anxiety Disorder Sister     Anxiety Disorder Father     Prostate Cancer Father     Anesth Problems Neg Hx     Diabetes Neg Hx     Heart Disease Neg Hx     Hypertension Neg Hx         Physical Exam   Vitals:       /66 (Site: Left Upper Arm, Position: Sitting, Cuff Size: Small Adult)   Pulse 65   Temp 98.1 °F (36.7 °C) (Oral)   Resp 16   Ht 1.778 m (5' 10\")   Wt 76.2 kg (168 lb)   SpO2 98%   BMI 24.11 kg/m²      Physical Exam  Vitals reviewed.   Constitutional:       General: He is not in acute

## 2024-04-25 DIAGNOSIS — E78.2 MIXED HYPERLIPIDEMIA: Primary | ICD-10-CM

## 2024-04-25 LAB
ALBUMIN SERPL-MCNC: 3.8 G/DL (ref 3.5–5)
ALBUMIN/GLOB SERPL: 1.4 (ref 1.1–2.2)
ALP SERPL-CCNC: 76 U/L (ref 45–117)
ALT SERPL-CCNC: 26 U/L (ref 12–78)
ANION GAP SERPL CALC-SCNC: 5 MMOL/L (ref 5–15)
AST SERPL-CCNC: 18 U/L (ref 15–37)
BASOPHILS # BLD: 0.1 K/UL (ref 0–0.1)
BASOPHILS NFR BLD: 1 % (ref 0–1)
BILIRUB SERPL-MCNC: 0.3 MG/DL (ref 0.2–1)
BUN SERPL-MCNC: 11 MG/DL (ref 6–20)
BUN/CREAT SERPL: 15 (ref 12–20)
CALCIUM SERPL-MCNC: 9.4 MG/DL (ref 8.5–10.1)
CHLORIDE SERPL-SCNC: 104 MMOL/L (ref 97–108)
CHOLEST SERPL-MCNC: 146 MG/DL
CO2 SERPL-SCNC: 29 MMOL/L (ref 21–32)
CREAT SERPL-MCNC: 0.75 MG/DL (ref 0.7–1.3)
DIFFERENTIAL METHOD BLD: NORMAL
EOSINOPHIL # BLD: 0.4 K/UL (ref 0–0.4)
EOSINOPHIL NFR BLD: 5 % (ref 0–7)
ERYTHROCYTE [DISTWIDTH] IN BLOOD BY AUTOMATED COUNT: 13.8 % (ref 11.5–14.5)
EST. AVERAGE GLUCOSE BLD GHB EST-MCNC: 108 MG/DL
GLOBULIN SER CALC-MCNC: 2.8 G/DL (ref 2–4)
GLUCOSE SERPL-MCNC: 87 MG/DL (ref 65–100)
HBA1C MFR BLD: 5.4 % (ref 4–5.6)
HCT VFR BLD AUTO: 39.5 % (ref 36.6–50.3)
HDLC SERPL-MCNC: 30 MG/DL
HDLC SERPL: 4.9 (ref 0–5)
HGB BLD-MCNC: 13.1 G/DL (ref 12.1–17)
IMM GRANULOCYTES # BLD AUTO: 0 K/UL (ref 0–0.04)
IMM GRANULOCYTES NFR BLD AUTO: 0 % (ref 0–0.5)
LDLC SERPL CALC-MCNC: 41.8 MG/DL (ref 0–100)
LYMPHOCYTES # BLD: 1.8 K/UL (ref 0.8–3.5)
LYMPHOCYTES NFR BLD: 22 % (ref 12–49)
MCH RBC QN AUTO: 29.1 PG (ref 26–34)
MCHC RBC AUTO-ENTMCNC: 33.2 G/DL (ref 30–36.5)
MCV RBC AUTO: 87.8 FL (ref 80–99)
MONOCYTES # BLD: 0.7 K/UL (ref 0–1)
MONOCYTES NFR BLD: 8 % (ref 5–13)
NEUTS SEG # BLD: 5.4 K/UL (ref 1.8–8)
NEUTS SEG NFR BLD: 64 % (ref 32–75)
NRBC # BLD: 0 K/UL (ref 0–0.01)
NRBC BLD-RTO: 0 PER 100 WBC
PLATELET # BLD AUTO: 213 K/UL (ref 150–400)
PMV BLD AUTO: 9.6 FL (ref 8.9–12.9)
POTASSIUM SERPL-SCNC: 4.4 MMOL/L (ref 3.5–5.1)
PROT SERPL-MCNC: 6.6 G/DL (ref 6.4–8.2)
PSA SERPL-MCNC: 0.1 NG/ML (ref 0.01–4)
RBC # BLD AUTO: 4.5 M/UL (ref 4.1–5.7)
SODIUM SERPL-SCNC: 138 MMOL/L (ref 136–145)
TRIGL SERPL-MCNC: 371 MG/DL
TSH SERPL DL<=0.05 MIU/L-ACNC: 2.59 UIU/ML (ref 0.36–3.74)
VLDLC SERPL CALC-MCNC: 74.2 MG/DL
WBC # BLD AUTO: 8.4 K/UL (ref 4.1–11.1)

## 2024-04-25 RX ORDER — OMEGA-3-ACID ETHYL ESTERS 1 G/1
2 CAPSULE, LIQUID FILLED ORAL 2 TIMES DAILY
Qty: 360 CAPSULE | Refills: 0 | Status: SHIPPED | OUTPATIENT
Start: 2024-04-25 | End: 2024-07-24

## 2024-05-16 DIAGNOSIS — T78.40XA ALLERGIC REACTION, INITIAL ENCOUNTER: ICD-10-CM

## 2024-05-16 RX ORDER — CETIRIZINE HYDROCHLORIDE 10 MG/1
10 TABLET ORAL DAILY
Qty: 90 TABLET | Refills: 1 | Status: SHIPPED | OUTPATIENT
Start: 2024-05-16

## 2024-06-11 DIAGNOSIS — M10.9 GOUT, UNSPECIFIED: ICD-10-CM

## 2024-06-11 RX ORDER — ALLOPURINOL 300 MG/1
TABLET ORAL
Qty: 90 TABLET | Refills: 1 | Status: SHIPPED | OUTPATIENT
Start: 2024-06-11

## 2024-07-19 RX ORDER — ROSUVASTATIN CALCIUM 10 MG/1
TABLET, COATED ORAL
Qty: 90 TABLET | Refills: 3 | Status: SHIPPED | OUTPATIENT
Start: 2024-07-19

## 2025-01-16 ENCOUNTER — PATIENT MESSAGE (OUTPATIENT)
Facility: CLINIC | Age: 60
End: 2025-01-16

## 2025-01-16 DIAGNOSIS — M10.9 GOUT, UNSPECIFIED: ICD-10-CM

## 2025-01-16 RX ORDER — ALLOPURINOL 300 MG/1
300 TABLET ORAL DAILY
Qty: 90 TABLET | Refills: 1 | Status: SHIPPED | OUTPATIENT
Start: 2025-01-16

## 2025-02-17 ENCOUNTER — OFFICE VISIT (OUTPATIENT)
Facility: CLINIC | Age: 60
End: 2025-02-17
Payer: COMMERCIAL

## 2025-02-17 VITALS
HEIGHT: 70 IN | DIASTOLIC BLOOD PRESSURE: 72 MMHG | OXYGEN SATURATION: 98 % | HEART RATE: 60 BPM | BODY MASS INDEX: 23.11 KG/M2 | WEIGHT: 161.4 LBS | SYSTOLIC BLOOD PRESSURE: 109 MMHG | TEMPERATURE: 97.8 F | RESPIRATION RATE: 15 BRPM

## 2025-02-17 DIAGNOSIS — Z12.5 SCREENING FOR PROSTATE CANCER: ICD-10-CM

## 2025-02-17 DIAGNOSIS — R73.01 IFG (IMPAIRED FASTING GLUCOSE): ICD-10-CM

## 2025-02-17 DIAGNOSIS — M10.00 IDIOPATHIC GOUT, UNSPECIFIED CHRONICITY, UNSPECIFIED SITE: ICD-10-CM

## 2025-02-17 DIAGNOSIS — F34.1 DYSTHYMIA: ICD-10-CM

## 2025-02-17 DIAGNOSIS — E78.2 MIXED HYPERLIPIDEMIA: Primary | ICD-10-CM

## 2025-02-17 PROCEDURE — 99214 OFFICE O/P EST MOD 30 MIN: CPT | Performed by: INTERNAL MEDICINE

## 2025-02-17 RX ORDER — SEMAGLUTIDE 2.4 MG/.75ML
INJECTION, SOLUTION SUBCUTANEOUS
COMMUNITY
Start: 2024-12-02

## 2025-02-17 SDOH — ECONOMIC STABILITY: FOOD INSECURITY: WITHIN THE PAST 12 MONTHS, THE FOOD YOU BOUGHT JUST DIDN'T LAST AND YOU DIDN'T HAVE MONEY TO GET MORE.: NEVER TRUE

## 2025-02-17 SDOH — ECONOMIC STABILITY: FOOD INSECURITY: WITHIN THE PAST 12 MONTHS, YOU WORRIED THAT YOUR FOOD WOULD RUN OUT BEFORE YOU GOT MONEY TO BUY MORE.: NEVER TRUE

## 2025-02-17 ASSESSMENT — PATIENT HEALTH QUESTIONNAIRE - PHQ9
SUM OF ALL RESPONSES TO PHQ QUESTIONS 1-9: 0
SUM OF ALL RESPONSES TO PHQ9 QUESTIONS 1 & 2: 0
8. MOVING OR SPEAKING SO SLOWLY THAT OTHER PEOPLE COULD HAVE NOTICED. OR THE OPPOSITE, BEING SO FIGETY OR RESTLESS THAT YOU HAVE BEEN MOVING AROUND A LOT MORE THAN USUAL: NOT AT ALL
4. FEELING TIRED OR HAVING LITTLE ENERGY: NOT AT ALL
7. TROUBLE CONCENTRATING ON THINGS, SUCH AS READING THE NEWSPAPER OR WATCHING TELEVISION: NOT AT ALL
SUM OF ALL RESPONSES TO PHQ QUESTIONS 1-9: 0
3. TROUBLE FALLING OR STAYING ASLEEP: NOT AT ALL
2. FEELING DOWN, DEPRESSED OR HOPELESS: NOT AT ALL
1. LITTLE INTEREST OR PLEASURE IN DOING THINGS: NOT AT ALL
9. THOUGHTS THAT YOU WOULD BE BETTER OFF DEAD, OR OF HURTING YOURSELF: NOT AT ALL
6. FEELING BAD ABOUT YOURSELF - OR THAT YOU ARE A FAILURE OR HAVE LET YOURSELF OR YOUR FAMILY DOWN: NOT AT ALL
10. IF YOU CHECKED OFF ANY PROBLEMS, HOW DIFFICULT HAVE THESE PROBLEMS MADE IT FOR YOU TO DO YOUR WORK, TAKE CARE OF THINGS AT HOME, OR GET ALONG WITH OTHER PEOPLE: NOT DIFFICULT AT ALL
SUM OF ALL RESPONSES TO PHQ QUESTIONS 1-9: 0
SUM OF ALL RESPONSES TO PHQ QUESTIONS 1-9: 0
5. POOR APPETITE OR OVEREATING: NOT AT ALL

## 2025-02-17 NOTE — PROGRESS NOTES
Identified pt with two pt identifiers(name and ). Reviewed record in preparation for visit and have obtained necessary documentation. All patient medications has been reviewed.  Chief Complaint   Patient presents with    Follow-up       Health Maintenance Due   Topic    Hepatitis B vaccine (1 of 3 - 19+ 3-dose series)    Pneumococcal 50+ years Vaccine (1 of 1 - PCV)     Health Maintenance Review: Patient reminded of \"due or due soon\" health maintenance. I have asked the patient to contact his/her primary care provider (PCP) for follow-up on his/her health maintenance.    Wt Readings from Last 3 Encounters:   25 73.2 kg (161 lb 6.4 oz)   24 76.2 kg (168 lb)   10/09/23 85.7 kg (189 lb)     Temp Readings from Last 3 Encounters:   25 97.8 °F (36.6 °C)   24 98.1 °F (36.7 °C) (Oral)   10/09/23 97.5 °F (36.4 °C) (Oral)     BP Readings from Last 3 Encounters:   25 109/72   24 106/66   10/09/23 106/66     Pulse Readings from Last 3 Encounters:   25 60   24 65   10/09/23 50       1. \"Have you been to the ER, urgent care clinic since your last visit?  Hospitalized since your last visit?\" No    2. \"Have you seen or consulted any other health care providers outside of the [facility] since your last visit?\" No     3. For patients aged 45-75: Has the patient had a colonoscopy / FIT/ Cologuard? Yes - Care Gap present. Most recent result on file    Patient is accompanied by self I have received verbal consent from Matthias Mendoza to discuss any/all medical information while they are present in the room.

## 2025-02-18 NOTE — PROGRESS NOTES
HISTORY OF PRESENT ILLNESS    Chief Complaint   Patient presents with    Follow-up       Presents for follow-up    History of Present Illness  The patient is a 60-year-old male who presents for a follow-up visit.    He has been actively participating in the [employer] program, which he initiated approximately a year ago. Despite initial challenges with Zepbound due to insurance coverage issues, he transitioned to Wegovy, experiencing some skin reactions that have since resolved. He has achieved a weight loss of over 30 pounds, reducing from 190 to 159 pounds. He has reduced the frequency of his Wegovy administration to every 10 to 12 days, which has helped him maintain his current weight. He has recently enrolled in [employer] training as part of his New Year's resolution and plans to attend classes twice a week. He has discontinued gym workouts due to previous neck surgeries but maintains physical activity through treadmill use. He reports improved sleep quality and increased energy levels. He has attempted various dietary programs, including a gluten-free, sugar-free, and dairy-free diet with intermittent fasting, which resulted in a weight loss of 30 pounds. However, he found it challenging to adhere to this diet long-term. He reports a decrease in food cravings and portion sizes, particularly at night. He has recently started using a spray and is monitoring its effects over the next few months.    He has a history of prediabetes, which has persisted despite his weight loss efforts.   Hemoglobin A1c was 5.8% in September 2024.    He does not experience any heartburn, chest pressure, or ankle swelling.    Anxiety and mood are reasonably controlled on Celexa.    Hyperlipidemia  Continue Crestor 5 mg daily.  Continue Lovaza.  ROS: taking medications as instructed, no medication side effects noted  No new myalgias, no joint pains, no weakness  No TIA's, no chest pain on exertion, no dyspnea on exertion, no swelling of

## 2025-04-15 ENCOUNTER — PATIENT MESSAGE (OUTPATIENT)
Facility: CLINIC | Age: 60
End: 2025-04-15

## 2025-04-15 DIAGNOSIS — E66.811 OBESITY (BMI 30.0-34.9): Primary | ICD-10-CM

## 2025-04-16 RX ORDER — SEMAGLUTIDE 2.4 MG/.75ML
2.4 INJECTION, SOLUTION SUBCUTANEOUS
Qty: 9 ML | Refills: 2 | Status: ACTIVE | OUTPATIENT
Start: 2025-04-16

## 2025-07-06 DIAGNOSIS — M10.9 GOUT, UNSPECIFIED: ICD-10-CM

## 2025-07-06 RX ORDER — ALLOPURINOL 300 MG/1
300 TABLET ORAL DAILY
Qty: 90 TABLET | Refills: 1 | Status: SHIPPED | OUTPATIENT
Start: 2025-07-06

## 2025-07-19 DIAGNOSIS — E78.2 MIXED HYPERLIPIDEMIA: Primary | ICD-10-CM

## 2025-07-21 RX ORDER — ROSUVASTATIN CALCIUM 10 MG/1
TABLET, COATED ORAL
Qty: 90 TABLET | Refills: 3 | Status: SHIPPED | OUTPATIENT
Start: 2025-07-21

## 2025-08-07 ENCOUNTER — PATIENT MESSAGE (OUTPATIENT)
Facility: CLINIC | Age: 60
End: 2025-08-07

## 2025-08-12 LAB
PSA SERPL-MCNC: 0.1 NG/ML (ref 0–4)
URATE SERPL-MCNC: 4.1 MG/DL (ref 3.8–8.4)

## (undated) DEVICE — DRAPE MICSCP W46XL120IN POLY DRAWSTRAP W STEREO OBS TB AND

## (undated) DEVICE — FLOSEAL HEMOSTATIC MATRIX, 5ML: Brand: FLOSEAL HEMOSTATIC MATRIX

## (undated) DEVICE — COLLAR FOAM CERV ADJ 3X22IN --

## (undated) DEVICE — GAUZE BORDERED 4X8 --

## (undated) DEVICE — COVER LT HNDL PLAS RIG 1 PER PK

## (undated) DEVICE — ADHESIVE SKIN CLSR 0.7ML TOP DERMBND ADV

## (undated) DEVICE — COVER,TABLE,HEAVY DUTY,60"X90",STRL: Brand: MEDLINE

## (undated) DEVICE — DRILL BIT 7080510 11 MM DRILL BIT S

## (undated) DEVICE — TOOL 14MH30 LEGEND 14CM 3MM: Brand: MIDAS REX ™

## (undated) DEVICE — DRAPE SURG W41XL74IN CLR FULL SZ C ARM 3 ADH POLY STRP E

## (undated) DEVICE — BIPOLAR IRRIGATOR INTEGRATED TUBING AND BIPOLAR CORD SET, DISPOSABLE

## (undated) DEVICE — SYR 20ML LL STRL LF --

## (undated) DEVICE — SUTURE MCRYL SZ 4-0 L27IN ABSRB UD L19MM PS-2 1/2 CIR PRIM Y426H

## (undated) DEVICE — SCREW EXT FIX L14MM FOR DISTRCTN

## (undated) DEVICE — SUT VCRL 2-0 18IN CP2 MP UD --

## (undated) DEVICE — SOLUTION IRRIG 1000ML 0.9% SOD CHL USP POUR PLAS BTL

## (undated) DEVICE — ANTERIOR CERVICAL-SMH: Brand: MEDLINE INDUSTRIES, INC.

## (undated) DEVICE — SOL INJ SOD CL 0.9% 250ML BG --

## (undated) DEVICE — LOCKING PLATE
Type: IMPLANTABLE DEVICE | Site: SPINE CERVICAL | Status: NON-FUNCTIONAL
Brand: ENDOSKELETON® TCS STERILE BARRIER SYSTEM
Removed: 2023-02-28

## (undated) DEVICE — BONE WAX WHITE: Brand: BONE WAX WHITE

## (undated) DEVICE — MARKER,SKIN,WI/RULER AND LABELS: Brand: MEDLINE

## (undated) DEVICE — INTENDED FOR TISSUE SEPARATION, AND OTHER PROCEDURES THAT REQUIRE A SHARP SURGICAL BLADE TO PUNCTURE OR CUT.: Brand: BARD-PARKER ® CARBON RIB-BACK BLADES

## (undated) DEVICE — BONE SCREW, LOCKING, 3.5MM X 16MM
Type: IMPLANTABLE DEVICE | Site: SPINE CERVICAL | Status: NON-FUNCTIONAL
Brand: ENDOSKELETON® TCS
Removed: 2023-02-28

## (undated) DEVICE — TUBING, SUCTION, 1/4" X 10', STRAIGHT: Brand: MEDLINE